# Patient Record
Sex: FEMALE | Race: WHITE | NOT HISPANIC OR LATINO | Employment: PART TIME | URBAN - METROPOLITAN AREA
[De-identification: names, ages, dates, MRNs, and addresses within clinical notes are randomized per-mention and may not be internally consistent; named-entity substitution may affect disease eponyms.]

---

## 2021-04-27 ENCOUNTER — INITIAL PRENATAL (OUTPATIENT)
Dept: OBGYN | Facility: CLINIC | Age: 36
End: 2021-04-27
Payer: COMMERCIAL

## 2021-04-27 VITALS — DIASTOLIC BLOOD PRESSURE: 72 MMHG | SYSTOLIC BLOOD PRESSURE: 108 MMHG | WEIGHT: 243 LBS

## 2021-04-27 DIAGNOSIS — O09.90 HIGH RISK PREGNANCY, ANTEPARTUM: ICD-10-CM

## 2021-04-27 DIAGNOSIS — O09.91 HIGH-RISK PREGNANCY IN FIRST TRIMESTER: ICD-10-CM

## 2021-04-27 PROCEDURE — 59401 PR NEW OB VISIT: CPT | Performed by: OBSTETRICS & GYNECOLOGY

## 2021-04-27 RX ORDER — PYRIDOXINE HCL (VITAMIN B6) 25 MG
25 TABLET ORAL
COMMUNITY
Start: 2021-04-15 | End: 2021-08-23

## 2021-04-27 RX ORDER — PANTOPRAZOLE SODIUM 40 MG/1
TABLET, DELAYED RELEASE ORAL
COMMUNITY
Start: 2020-05-07 | End: 2021-08-23

## 2021-04-27 RX ORDER — PROMETHAZINE HYDROCHLORIDE 25 MG/1
25 TABLET ORAL
COMMUNITY
Start: 2021-03-12 | End: 2021-08-23

## 2021-04-27 RX ORDER — ASPIRIN 81 MG/1
81 TABLET ORAL DAILY
COMMUNITY
Start: 2021-04-09 | End: 2021-08-23

## 2021-04-27 RX ORDER — PNV NO.95/FERROUS FUM/FOLIC AC 28MG-0.8MG
1 TABLET ORAL DAILY
COMMUNITY
Start: 2021-02-26 | End: 2021-08-23

## 2021-04-27 ASSESSMENT — EDINBURGH POSTNATAL DEPRESSION SCALE (EPDS)
THE THOUGHT OF HARMING MYSELF HAS OCCURRED TO ME: NEVER
I HAVE FELT SCARED OR PANICKY FOR NO GOOD REASON: NO, NOT MUCH
I HAVE BLAMED MYSELF UNNECESSARILY WHEN THINGS WENT WRONG: NOT VERY OFTEN
I HAVE BEEN ABLE TO LAUGH AND SEE THE FUNNY SIDE OF THINGS: AS MUCH AS I ALWAYS COULD
I HAVE BEEN SO UNHAPPY THAT I HAVE BEEN CRYING: NO, NEVER
I HAVE LOOKED FORWARD WITH ENJOYMENT TO THINGS: AS MUCH AS I EVER DID
I HAVE FELT SAD OR MISERABLE: NO, NOT AT ALL
I HAVE BEEN SO UNHAPPY THAT I HAVE HAD DIFFICULTY SLEEPING: NOT AT ALL
I HAVE BEEN ANXIOUS OR WORRIED FOR NO GOOD REASON: NO, NOT AT ALL
THINGS HAVE BEEN GETTING ON TOP OF ME: YES, SOMETIMES I HAVEN'T BEEN COPING AS WELL AS USUAL
TOTAL SCORE: 4

## 2021-04-27 NOTE — PROGRESS NOTES
Cc: New OB visit    HPI:  The patient is a 36 y.o.  13w6d by lmp c/w 7wk US (3/12 measuring 7w4d in [facility] notes)Patient's last menstrual period was 2021.      She presents for her new obstetric visit.  Recently moved from [place].  Currently feels well.  No major concerns today.  Patient does have a history of gastric bypass between her last 2 pregnancies, also has a history of repaired perforated ulcer,  x2, abdominoplasty, mammoplasty.  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  Denies complications with her other pregnancies including elevated blood pressures and diabetes.  Has had normal prenatal labs as below including NIPT genetic screening.    ROS:  12 system ROS performed, pertinent positives and negatives as above in HPI; see intake form for details.            OB History    Para Term  AB Living   4 2 2   1 2   SAB TAB Ectopic Molar Multiple Live Births   1   0     2      # Outcome Date GA Lbr Tylor/2nd Weight Sex Delivery Anes PTL Lv   4 Current            3 Term 19 38w4d  2.22 kg (4 lb 14.3 oz) F CS-LTranv Spinal  MEAGHAN      Birth Comments: SGA   2 SAB            1 Term 07/04/10 40w0d  3.118 kg (6 lb 14 oz) M CS-LTranv   MEAGHAN      Complications: Cephalopelvic Disproportion   no diabetes or elevated Bps.      GYNHx:  13/28/3  Denies hx of STIs  Denies hx of abnormal paps, last  NILM    PMHx: denies    Past Surgical History:   Procedure Laterality Date   • ABDOMINOPLASTY     • GASTRIC BYPASS LAPAROSCOPIC     • MAMMOPLASTY AUGMENTATION     • PRIMARY C SECTION      c/s x2   • REPAIR PERFORATED ULCER       Current Outpatient Medications on File Prior to Visit   Medication Sig Dispense Refill   • doxylamine (UNISOM) 25 MG Tab tablet Take 0.5 tablet by mouth 1 to 2 times a day     • pantoprazole (PROTONIX) 40 MG Tablet Delayed Response Take  by mouth.     • Prenatal Multivit-Min-Fe-FA (PRENATAL/IRON) Tab Take 1 tablet by mouth every day.     • promethazine  (PHENERGAN) 25 MG Tab Take 25 mg by mouth.     • pyridoxine (VITAMIN B-6) 25 MG Tab Take 25 mg by mouth.     • Multiple Vitamins-Minerals (MULTIVITAMIN ADULT EXTRA C PO) Take  by mouth.       No current facility-administered medications on file prior to visit.       Allergies:   Allergies as of 2021 - Reviewed 2021   Allergen Reaction Noted   • Sulfa drugs  2014       Family History   Problem Relation Age of Onset   • No Known Problems Mother    • No Known Problems Father    • No Known Problems Sister    • No Known Problems Brother        Social History     Tobacco Use   • Smoking status: Never Smoker   • Smokeless tobacco: Never Used   Substance Use Topics   • Alcohol use: Not Currently   • Drug use: Never       PE:    /72   Wt 110 kg (243 lb)   Gen: AAO, NAD  Abd: soft, FHTs 150 by doppler      CF/SMA neg  A1c 5.0  Rh+/-  RI  HIV/HBsAg neg/T pall neg  NIPT neg  B12 wnl, TSH wnl, B1 wnl    A/P:  36 y.o.  13w6d based upon by LMP consistent with 7-week ultrasound w/ hx of c/s x2, gastric bypass, AMA  1. High-risk pregnancy in first trimester     2. High risk pregnancy, antepartum  REFERRAL TO PERINATOLOGY       - discussed hospitalist coverage of L&D coverage, docs and CNM involvement in care.  - hx c/s x2: Patient desires repeat  with permanent sterilization    -History of gastric bypass: Vitamin levels checked as above and normal, will need to repeat going into the third trimester.  Will need serial growth ultrasounds, will get perinatology consult for this with anatomy ultrasound  -AMA: Status post normal NIPT    After visit noted patient has not yet had gonorrhea, chlamydia screening in this pregnancy.  Will recommend at follow-up visit.    F/U 4wks    Allie Schmid MD  [facility], Women's [facility]       1) You have an appointment with interventional radiology here at [facility] on Monday, 8/21/17 at 11:00am. Please arrive no later than 10:30am. Upon arrival, please go to outpatient registration.     2) Please make an appointment to follow-up with Dr. Silvestre within 7-10 days after your discharge. You may call her office to make an appointment at: [phone].

## 2021-04-27 NOTE — PROGRESS NOTES
Pt. Here for NOB visit today.  #[phone]  First prenatal care  Pt. States that she is not able to eat and really bad stomach cramps. Pt states that she has headaches constantly   Pharmacy verified  Chaperone offered and provided

## 2021-04-28 RX ORDER — PROMETHAZINE HYDROCHLORIDE 25 MG/1
25 TABLET ORAL
COMMUNITY
Start: 2021-03-12 | End: 2021-08-23

## 2021-05-06 ENCOUNTER — TELEPHONE (OUTPATIENT)
Dept: OBGYN | Facility: CLINIC | Age: 36
End: 2021-05-06

## 2021-05-06 ENCOUNTER — NURSE TRIAGE (OUTPATIENT)
Dept: HEALTH INFORMATION MANAGEMENT | Facility: OTHER | Age: 36
End: 2021-05-06

## 2021-05-06 NOTE — TELEPHONE ENCOUNTER
Patient called because she is having abdominal cramping and clear discharge that has been going on every 4-5 days. Advised pt to keep up with water intake, relax and to take tylenol no more that 3000 mg in a 24 hr period. Advised pt that if the pain gets to were its not bearable she would need to be evaluated at the hospital. No further questions.

## 2021-05-06 NOTE — TELEPHONE ENCOUNTER
05/06/21 3:27 PM    Pt called nurse triage stating she thinks she has an UTI, having cramping and abdominal pain with discharge.  Nurse triage states the pt hung up before she could transfer. Called pt and went straight to . LM for pt to call back.

## 2021-05-06 NOTE — TELEPHONE ENCOUNTER
"Pt 15 weeks pregnant and stating  She is feeling lower abd pain that radiates to back. In the last 15 mins started feeling cramping. Clear vaginal discharge noted. Denies all other s/sx. Pt of [facility]. MA called and care assumed. Will call pt and triage.     Reason for Disposition  • [1] Intermittent lower abdominal pain (e.g., cramping) AND [2] present > 24 hours    Additional Information  • Negative: Passed out (i.e., lost consciousness, collapsed and was not responding)  • Negative: Shock suspected (e.g., cold/pale/clammy skin, too weak to stand, low BP, rapid pulse)  • Negative: Difficult to awaken or acting confused (e.g., disoriented, slurred speech)  • Negative: Sounds like a life-threatening emergency to the triager  • Negative: Followed an abdomen (stomach) injury  • Negative: [1] Abdominal pain AND [2] pregnant > 20 weeks  • Negative: MODERATE-SEVERE abdominal pain (e.g., interferes with normal activities, awakens from sleep)  • Negative: [1] SEVERE vaginal bleeding (i.e., soaking 2 pads / hour, large blood clots) AND [2] present 2 or more hours  • Negative: [1] MODERATE vaginal bleeding (i.e., soaking 1 pad / hour; clots) AND [2] present > 6 hours  • Negative: [1] MODERATE vaginal bleeding (i.e., soaking 1 pad / hour; clots) AND [2] pregnant > 12 weeks  • Negative: Passed tissue (e.g., gray-white)  • Negative: [1] Vomiting AND [2] contains red blood or black (\"coffee ground\") material  (Exception: few red streaks in vomit that only happened once)  • Negative: Shoulder pain  • Negative: Lightheadedness or dizziness (e.g., feels like passing out)  • Negative: Patient sounds very sick or weak to the triager  • Negative: [1] Constant abdominal pain AND [2] present > 2 hours  • Negative: Blood in urine (red, pink, or tea-colored)  • Negative: Fever > 100.4 F (38.0 C)  • Negative: White of the eyes have turned yellow (i.e., jaundice)    Answer Assessment - Initial Assessment Questions  1. LOCATION: \"Where does it " "hurt?\"       stomach  2. RADIATION: \"Does the pain shoot anywhere else?\" (e.g., chest, back, shoulder)      Radiates to back and comes and goes  3. ONSET: \"When did the pain begin?\" (e.g., minutes, hours or days ago)       10 mins and all day  4. ONSET: \"Gradual or sudden onset?\"      sudden  5. PATTERN \"Does the pain come and go, or has it been constant since it started?\"       Come and go  6. SEVERITY: \"How bad is the pain?\" \"What does it keep you from doing?\"  (e.g., Scale 1-10; mild, moderate, or severe)    - MILD (1-3): doesn't interfere with normal activities, abdomen soft and not tender to touch     - MODERATE (4-7): interferes with normal activities or awakens from sleep, tender to touch     - SEVERE (8-10): excruciating pain, doubled over, unable to do any normal activities      8  7. RECURRENT SYMPTOM: \"Have you ever had this type of abdominal pain before?\" If so, ask: \"When was the last time?\" and \"What happened that time?\"       no  8. CAUSE: \"What do you think is causing the abdominal pain?\"      unsure  9. RELIEVING/AGGRAVATING FACTORS: \"What makes it better or worse?\" (e.g., antacids, bowel movement, movement)      Go bathroom makes it worse  10. OTHER SYMPTOMS: \"Has there been any vaginal bleeding, fever, vomiting, diarrhea, or urine problems?\"        Vomiting, vaginal discharge,   11. PRASANTH: \"What date are you expecting to deliver?\"        10/22    Protocols used: PREGNANCY - ABDOMINAL PAIN LESS THAN 20 WEEKS EGA-A-AH      "

## 2021-05-26 ENCOUNTER — ROUTINE PRENATAL (OUTPATIENT)
Dept: OBGYN | Facility: CLINIC | Age: 36
End: 2021-05-26
Payer: COMMERCIAL

## 2021-05-26 VITALS — WEIGHT: 241.1 LBS | DIASTOLIC BLOOD PRESSURE: 90 MMHG | SYSTOLIC BLOOD PRESSURE: 119 MMHG

## 2021-05-26 DIAGNOSIS — Z34.92 SECOND TRIMESTER PREGNANCY: ICD-10-CM

## 2021-05-26 DIAGNOSIS — O16.2 ELEVATED BLOOD PRESSURE AFFECTING PREGNANCY IN SECOND TRIMESTER, ANTEPARTUM: ICD-10-CM

## 2021-05-26 DIAGNOSIS — O21.9 NAUSEA AND VOMITING OF PREGNANCY, ANTEPARTUM: ICD-10-CM

## 2021-05-26 PROCEDURE — 90040 PR PRENATAL FOLLOW UP: CPT | Performed by: OBSTETRICS & GYNECOLOGY

## 2021-05-26 RX ORDER — ONDANSETRON 4 MG/1
4 TABLET, ORALLY DISINTEGRATING ORAL EVERY 6 HOURS PRN
Qty: 20 TABLET | Refills: 1 | Status: SHIPPED | OUTPATIENT
Start: 2021-05-26 | End: 2021-08-23

## 2021-06-23 ENCOUNTER — HOSPITAL ENCOUNTER (OUTPATIENT)
Dept: LAB | Facility: MEDICAL CENTER | Age: 36
End: 2021-06-23
Attending: OBSTETRICS & GYNECOLOGY
Payer: COMMERCIAL

## 2021-06-23 ENCOUNTER — HOSPITAL ENCOUNTER (OUTPATIENT)
Dept: RADIOLOGY | Facility: MEDICAL CENTER | Age: 36
End: 2021-06-23
Attending: OBSTETRICS & GYNECOLOGY
Payer: COMMERCIAL

## 2021-06-23 DIAGNOSIS — O16.2 ELEVATED BLOOD PRESSURE AFFECTING PREGNANCY IN SECOND TRIMESTER, ANTEPARTUM: ICD-10-CM

## 2021-06-23 DIAGNOSIS — Z34.92 SECOND TRIMESTER PREGNANCY: ICD-10-CM

## 2021-06-23 LAB
ALBUMIN SERPL BCP-MCNC: 3.6 G/DL (ref 3.2–4.9)
ALBUMIN/GLOB SERPL: 1.1 G/DL
ALP SERPL-CCNC: 75 U/L (ref 30–99)
ALT SERPL-CCNC: 10 U/L (ref 2–50)
ANION GAP SERPL CALC-SCNC: 9 MMOL/L (ref 7–16)
AST SERPL-CCNC: 15 U/L (ref 12–45)
BASOPHILS # BLD AUTO: 0.1 % (ref 0–1.8)
BASOPHILS # BLD: 0.01 K/UL (ref 0–0.12)
BILIRUB SERPL-MCNC: 0.3 MG/DL (ref 0.1–1.5)
BUN SERPL-MCNC: 8 MG/DL (ref 8–22)
CALCIUM SERPL-MCNC: 9.2 MG/DL (ref 8.5–10.5)
CHLORIDE SERPL-SCNC: 106 MMOL/L (ref 96–112)
CO2 SERPL-SCNC: 21 MMOL/L (ref 20–33)
CREAT SERPL-MCNC: 0.56 MG/DL (ref 0.5–1.4)
CREAT UR-MCNC: 116.49 MG/DL
EOSINOPHIL # BLD AUTO: 0.04 K/UL (ref 0–0.51)
EOSINOPHIL NFR BLD: 0.4 % (ref 0–6.9)
ERYTHROCYTE [DISTWIDTH] IN BLOOD BY AUTOMATED COUNT: 47.5 FL (ref 35.9–50)
GLOBULIN SER CALC-MCNC: 3.2 G/DL (ref 1.9–3.5)
GLUCOSE SERPL-MCNC: 82 MG/DL (ref 65–99)
HCT VFR BLD AUTO: 36.6 % (ref 37–47)
HGB BLD-MCNC: 11.8 G/DL (ref 12–16)
IMM GRANULOCYTES # BLD AUTO: 0.04 K/UL (ref 0–0.11)
IMM GRANULOCYTES NFR BLD AUTO: 0.4 % (ref 0–0.9)
LYMPHOCYTES # BLD AUTO: 1.38 K/UL (ref 1–4.8)
LYMPHOCYTES NFR BLD: 14.4 % (ref 22–41)
MCH RBC QN AUTO: 30 PG (ref 27–33)
MCHC RBC AUTO-ENTMCNC: 32.2 G/DL (ref 33.6–35)
MCV RBC AUTO: 93.1 FL (ref 81.4–97.8)
MONOCYTES # BLD AUTO: 0.36 K/UL (ref 0–0.85)
MONOCYTES NFR BLD AUTO: 3.8 % (ref 0–13.4)
NEUTROPHILS # BLD AUTO: 7.76 K/UL (ref 2–7.15)
NEUTROPHILS NFR BLD: 80.9 % (ref 44–72)
NRBC # BLD AUTO: 0 K/UL
NRBC BLD-RTO: 0 /100 WBC
PLATELET # BLD AUTO: 289 K/UL (ref 164–446)
PMV BLD AUTO: 11 FL (ref 9–12.9)
POTASSIUM SERPL-SCNC: 4.4 MMOL/L (ref 3.6–5.5)
PROT SERPL-MCNC: 6.8 G/DL (ref 6–8.2)
PROT UR-MCNC: 8 MG/DL (ref 0–15)
PROT/CREAT UR: 69 MG/G (ref 10–107)
RBC # BLD AUTO: 3.93 M/UL (ref 4.2–5.4)
SODIUM SERPL-SCNC: 136 MMOL/L (ref 135–145)
WBC # BLD AUTO: 9.6 K/UL (ref 4.8–10.8)

## 2021-06-23 PROCEDURE — 85025 COMPLETE CBC W/AUTO DIFF WBC: CPT

## 2021-06-23 PROCEDURE — 76805 OB US >/= 14 WKS SNGL FETUS: CPT

## 2021-06-23 PROCEDURE — 80053 COMPREHEN METABOLIC PANEL: CPT

## 2021-06-23 PROCEDURE — 36415 COLL VENOUS BLD VENIPUNCTURE: CPT

## 2021-06-23 PROCEDURE — 81511 FTL CGEN ABNOR FOUR ANAL: CPT

## 2021-06-23 PROCEDURE — 84156 ASSAY OF PROTEIN URINE: CPT

## 2021-06-23 PROCEDURE — 82570 ASSAY OF URINE CREATININE: CPT

## 2021-06-25 ENCOUNTER — ROUTINE PRENATAL (OUTPATIENT)
Dept: OBGYN | Facility: CLINIC | Age: 36
End: 2021-06-25
Payer: COMMERCIAL

## 2021-06-25 VITALS — DIASTOLIC BLOOD PRESSURE: 63 MMHG | WEIGHT: 245 LBS | SYSTOLIC BLOOD PRESSURE: 96 MMHG

## 2021-06-25 DIAGNOSIS — O09.892 SUPERVISION OF OTHER HIGH RISK PREGNANCIES, SECOND TRIMESTER: Primary | ICD-10-CM

## 2021-06-25 PROCEDURE — 90040 PR PRENATAL FOLLOW UP: CPT | Performed by: NURSE PRACTITIONER

## 2021-06-25 ASSESSMENT — FIBROSIS 4 INDEX: FIB4 SCORE: 0.57

## 2021-06-25 NOTE — PROGRESS NOTES
No concerns today.  Feeling good FM.  Reviewed her labs and US with her.  Will order repeat as not able to visualize spine.

## 2021-06-26 LAB
# FETUSES US: NORMAL
AFP MOM SERPL: 0.76
AFP SERPL-MCNC: 45 NG/ML
AGE - REPORTED: 35.8 YR
CURRENT SMOKER: NO
FAMILY MEMBER DISEASES HX: NO
GA METHOD: NORMAL
GA: NORMAL WK
HCG MOM SERPL: 0.44
HCG SERPL-ACNC: 6283 IU/L
HX OF HEREDITARY DISORDERS: NO
IDDM PATIENT QL: NO
INHIBIN A MOM SERPL: 0.72
INHIBIN A SERPL-MCNC: 113 PG/ML
INTEGRATED SCN PATIENT-IMP: NORMAL
PATHOLOGY STUDY: NORMAL
SPECIMEN DRAWN SERPL: NORMAL
U ESTRIOL MOM SERPL: 1.42
U ESTRIOL SERPL-MCNC: 3.72 NG/ML

## 2021-06-29 DIAGNOSIS — Z34.92 SECOND TRIMESTER PREGNANCY: ICD-10-CM

## 2021-07-08 ENCOUNTER — HOSPITAL ENCOUNTER (EMERGENCY)
Facility: MEDICAL CENTER | Age: 36
End: 2021-07-08
Attending: OBSTETRICS & GYNECOLOGY | Admitting: OBSTETRICS & GYNECOLOGY
Payer: COMMERCIAL

## 2021-07-08 VITALS
SYSTOLIC BLOOD PRESSURE: 128 MMHG | HEIGHT: 68 IN | BODY MASS INDEX: 36.68 KG/M2 | RESPIRATION RATE: 14 BRPM | TEMPERATURE: 97.8 F | WEIGHT: 242 LBS | DIASTOLIC BLOOD PRESSURE: 71 MMHG | HEART RATE: 80 BPM

## 2021-07-08 LAB
APPEARANCE UR: CLEAR
COLOR UR AUTO: YELLOW
GLUCOSE UR QL STRIP.AUTO: NEGATIVE MG/DL
KETONES UR QL STRIP.AUTO: NEGATIVE MG/DL
LEUKOCYTE ESTERASE UR QL STRIP.AUTO: NEGATIVE
NITRITE UR QL STRIP.AUTO: NEGATIVE
PH UR STRIP.AUTO: 6.5 [PH] (ref 5–8)
PROT UR QL STRIP: NEGATIVE MG/DL
RBC UR QL AUTO: NEGATIVE
SP GR UR STRIP.AUTO: 1.02 (ref 1–1.03)

## 2021-07-08 PROCEDURE — 700102 HCHG RX REV CODE 250 W/ 637 OVERRIDE(OP): Performed by: OBSTETRICS & GYNECOLOGY

## 2021-07-08 PROCEDURE — A9270 NON-COVERED ITEM OR SERVICE: HCPCS | Performed by: OBSTETRICS & GYNECOLOGY

## 2021-07-08 PROCEDURE — 99284 EMERGENCY DEPT VISIT MOD MDM: CPT

## 2021-07-08 PROCEDURE — 81002 URINALYSIS NONAUTO W/O SCOPE: CPT

## 2021-07-08 RX ORDER — ACETAMINOPHEN 325 MG/1
650 TABLET ORAL ONCE
Status: COMPLETED | OUTPATIENT
Start: 2021-07-08 | End: 2021-07-08

## 2021-07-08 RX ADMIN — ACETAMINOPHEN 650 MG: 325 TABLET, FILM COATED ORAL at 18:05

## 2021-07-08 ASSESSMENT — FIBROSIS 4 INDEX
FIB4 SCORE: 0.57
FIB4 SCORE: 0.57

## 2021-07-08 ASSESSMENT — PAIN SCALES - GENERAL: PAINLEVEL: 8

## 2021-07-08 NOTE — PROGRESS NOTES
36 y.o.    Edc=10/27  24.1 weeks    TOCO and US on.  VSS.  Pt presents to triage with c/o HA since this am accompanied by spots in her vision and nausea.  She has no other obstetrical complaints.  -Report given to Jose RN-pt care assumed

## 2021-07-09 NOTE — DISCHARGE INSTRUCTIONS
General Instructions:  · If you think you are in labor, time contractions (lying on your left side) from the beginning of one contraction to the beginning of the next contraction for at least one hour.  · Increase fluid intake: you should consume 10-12 8 oz glasses of non-caffeinated fluid per day.  · Report any pressure or burning on urination to your physician.  · Monitor fetal movement: If you notice an absence or decrease in fetal movement, drink a large glass of water and rest on your side.  If there is no increase in movement, call your physician or go to the hospital for further evaluation.  · Report any sudden, sharp abdominal pain.  · Report any bleeding.  Spotting or pinkish discharge is normal after vaginal exam.  You may also spot after sexual intercourse.    Pre-term Labor (<37 weeks):  Call your physician or return to the hospital if:  · You have painless regular contractions more than 4 in one hour.  · Your water breaks (remember time and color).  · You have menstrual-like cramps, a low dull backache or pressure in your pelvis or back.  · Your baby does not move enough to complete the daily kick count (10 movements in 2 hours).  · Your baby moves much less often than on the days before or you have not felt your baby move all day.  · Please review the MEDICATION LIST section of your AFTER VISIT SUMMARY document.  · Take your medication as prescribed    Urinary Tract Infection:  · Increase your fluid intake.  Avoid coffee, tea, soledad, and other carbonated drinks.  · Please review the MEDICATION LIST section of your AFTER VISIT SUMMARY document.  · Take medications as prescribed.  · Take Medication until it is gone even if you feel better.  · Notify your physician if there is no improvement in your condition.    High Blood Pressure:  · Rest on your right or left side.  · Report any severe headaches, dizziness, blurred vision or spots before your eyes.  · Report excessive swelling of your hands, face or  feet.  · Report epigastric pain (upper abdominal pain)      Other Instructions:  Please carefully review your entire AFTER VISIT SUMMARY document for all discharge instructions.
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
## 2021-07-09 NOTE — ED PROVIDER NOTES
OB H&P:    CC: Band like HA with vision changes    HPI:  Ms. Aleyda Verma is a 36 y.o.  @ 24w1d by LMP who presents to the OB ED reporting a band like HA that extends down into her upper neck with spots in her vision since the AM. She denies feeling more nauseous than baseline and has no vomiting. She has no SOB, palpitations, pain or unusual swelling in her calves.    Contractions: No   Loss of fluid: No   Vaginal bleeding: No   Fetal movement: present      PNC with [facility]    PNL: WNL for 24 week labs  RhNA, R NA, HIV neg NA, TrepAb neg NA, HBsAg NR NA, GC/CT neg/neg NA  Glucola: NA  GBS NA      ROS:  Const: denies fevers, general concerns  CV/resp: reports no concerns  GI: denies abd pain, GI concerns  : see HPI  Neuro: denies HA/vision changes    OB History    Para Term  AB Living   4 2 2   1 2   SAB TAB Ectopic Molar Multiple Live Births   1   0     2      # Outcome Date GA Lbr Tylor/2nd Weight Sex Delivery Anes PTL Lv   4 Current            3 Term 19 38w4d  2.22 kg (4 lb 14.3 oz) F CS-LTranv Spinal  MEAGHAN      Birth Comments: SGA   2 SAB            1 Term 07/04/10 40w0d  3.118 kg (6 lb 14 oz) M CS-LTranv   MEAGHAN      Complications: Cephalopelvic Disproportion         PMHx: Hx of migraines as a teenager. Acid reflux. Denies other medical hx.    Past Surgical History:   Procedure Laterality Date   • ABDOMINOPLASTY     • GASTRIC BYPASS LAPAROSCOPIC     • MAMMOPLASTY AUGMENTATION     • PRIMARY C SECTION      c/s x2   • REPAIR PERFORATED ULCER         No current facility-administered medications on file prior to encounter.     Current Outpatient Medications on File Prior to Encounter   Medication Sig Dispense Refill   • ondansetron (ZOFRAN ODT) 4 MG TABLET DISPERSIBLE Take 1 tablet by mouth every 6 hours as needed for Nausea. 20 tablet 1   • promethazine (PHENERGAN) 25 MG Tab Take 25 mg by mouth. (Patient not taking: Reported on 2021)     • aspirin 81 MG EC tablet Take 81 mg by mouth  every day. (Patient not taking: Reported on 2021)     • doxylamine (UNISOM) 25 MG Tab tablet Take 0.5 tablet by mouth 1 to 2 times a day     • pantoprazole (PROTONIX) 40 MG Tablet Delayed Response Take  by mouth. (Patient not taking: Reported on 2021)     • Prenatal Multivit-Min-Fe-FA (PRENATAL/IRON) Tab Take 1 tablet by mouth every day.     • promethazine (PHENERGAN) 25 MG Tab Take 25 mg by mouth. (Patient not taking: Reported on 2021)     • pyridoxine (VITAMIN B-6) 25 MG Tab Take 25 mg by mouth. (Patient not taking: Reported on 2021)     • Multiple Vitamins-Minerals (MULTIVITAMIN ADULT EXTRA C PO) Take  by mouth.         Family History   Problem Relation Age of Onset   • No Known Problems Mother    • No Known Problems Father    • No Known Problems Sister    • No Known Problems Brother        Social History     Tobacco Use   • Smoking status: Never Smoker   • Smokeless tobacco: Never Used   Vaping Use   • Vaping Use: Never used   Substance Use Topics   • Alcohol use: Not Currently   • Drug use: Never         PE:  Vitals:    21 1600 21 1644   BP:  128/71   Pulse:  80   Resp:  14   Temp:  36.6 °C (97.8 °F)   TempSrc:  Temporal   Weight: 111 kg (245 lb)      gen: AAO, NAD  Resp: Unlabored breathing on RA  abd: soft, gravid, NT,  Ext: NT, No edema    SVE: Not indicated at this time  FHT: 150 moderate variability, no decels, appropriate for gestation      A/P: 36 y.o.  @ 24w1d by LMP with HA and visual changes    Plan:  R/o Pre-eclampsia. /71. POC urine w/o protein  Likely Migraine vs tension HA.  OTC tylenol, 650 mg q6 PRN and 25 mg benadryl at night for sleep PRN  Discharge to home with strict return precautions          Pedro Pablo Rowley M.D.[email or web address]

## 2021-07-22 ENCOUNTER — HOSPITAL ENCOUNTER (OUTPATIENT)
Dept: RADIOLOGY | Facility: MEDICAL CENTER | Age: 36
End: 2021-07-22
Attending: OBSTETRICS & GYNECOLOGY
Payer: COMMERCIAL

## 2021-07-22 DIAGNOSIS — Z34.92 SECOND TRIMESTER PREGNANCY: ICD-10-CM

## 2021-07-22 PROCEDURE — 76815 OB US LIMITED FETUS(S): CPT

## 2021-07-27 ENCOUNTER — ROUTINE PRENATAL (OUTPATIENT)
Dept: OBGYN | Facility: CLINIC | Age: 36
End: 2021-07-27
Payer: COMMERCIAL

## 2021-07-27 VITALS — SYSTOLIC BLOOD PRESSURE: 124 MMHG | BODY MASS INDEX: 36.64 KG/M2 | WEIGHT: 241 LBS | DIASTOLIC BLOOD PRESSURE: 64 MMHG

## 2021-07-27 DIAGNOSIS — Z34.92 SECOND TRIMESTER PREGNANCY: ICD-10-CM

## 2021-07-27 PROCEDURE — 90040 PR PRENATAL FOLLOW UP: CPT | Performed by: OBSTETRICS & GYNECOLOGY

## 2021-07-27 RX ORDER — GLUCOSAMINE HCL/CHONDROITIN SU 500-400 MG
CAPSULE ORAL
Qty: 100 EACH | Refills: 6 | Status: SHIPPED | OUTPATIENT
Start: 2021-07-27 | End: 2021-08-23

## 2021-07-27 RX ORDER — LANCETS 30 GAUGE
EACH MISCELLANEOUS
Qty: 100 EACH | Refills: 6 | Status: SHIPPED | OUTPATIENT
Start: 2021-07-27 | End: 2021-08-23

## 2021-07-27 ASSESSMENT — FIBROSIS 4 INDEX: FIB4 SCORE: 0.57

## 2021-08-23 ENCOUNTER — ROUTINE PRENATAL (OUTPATIENT)
Dept: OBGYN | Facility: CLINIC | Age: 36
End: 2021-08-23
Payer: COMMERCIAL

## 2021-08-23 ENCOUNTER — HOSPITAL ENCOUNTER (OUTPATIENT)
Dept: LAB | Facility: MEDICAL CENTER | Age: 36
End: 2021-08-23
Attending: ADVANCED PRACTICE MIDWIFE
Payer: COMMERCIAL

## 2021-08-23 VITALS — WEIGHT: 245 LBS | SYSTOLIC BLOOD PRESSURE: 112 MMHG | DIASTOLIC BLOOD PRESSURE: 68 MMHG | BODY MASS INDEX: 37.25 KG/M2

## 2021-08-23 DIAGNOSIS — Z34.83 ENCOUNTER FOR SUPERVISION OF OTHER NORMAL PREGNANCY, THIRD TRIMESTER: ICD-10-CM

## 2021-08-23 LAB
EST. AVERAGE GLUCOSE BLD GHB EST-MCNC: 97 MG/DL
HBA1C MFR BLD: 5 % (ref 4–5.6)

## 2021-08-23 PROCEDURE — 36415 COLL VENOUS BLD VENIPUNCTURE: CPT

## 2021-08-23 PROCEDURE — 83036 HEMOGLOBIN GLYCOSYLATED A1C: CPT

## 2021-08-23 PROCEDURE — 90040 PR PRENATAL FOLLOW UP: CPT | Performed by: ADVANCED PRACTICE MIDWIFE

## 2021-08-23 ASSESSMENT — FIBROSIS 4 INDEX: FIB4 SCORE: 0.57

## 2021-08-23 NOTE — PROGRESS NOTES
Patient reports lancets and meter are too expensive. Estimated cost at the pharmacy was $500. No history of gestational diabetes. Patient does have history of gastric sleeve and can not complete 1hr GTT. I have provided hemoglobin A1C order for patient. We did discuss option of Reli On Meter at [facility] that has a significantly lower cost ~$50.

## 2021-09-03 ENCOUNTER — HOSPITAL ENCOUNTER (INPATIENT)
Facility: MEDICAL CENTER | Age: 36
LOS: 1 days | DRG: 833 | End: 2021-09-05
Attending: OBSTETRICS & GYNECOLOGY | Admitting: OBSTETRICS & GYNECOLOGY
Payer: COMMERCIAL

## 2021-09-03 LAB
A1 MICROGLOB PLACENTAL VAG QL: NEGATIVE
ALBUMIN SERPL BCP-MCNC: 3.4 G/DL (ref 3.2–4.9)
ALBUMIN/GLOB SERPL: 0.9 G/DL
ALP SERPL-CCNC: 105 U/L (ref 30–99)
ALT SERPL-CCNC: 10 U/L (ref 2–50)
ANION GAP SERPL CALC-SCNC: 15 MMOL/L (ref 7–16)
APPEARANCE UR: CLEAR
AST SERPL-CCNC: 14 U/L (ref 12–45)
BILIRUB SERPL-MCNC: 0.4 MG/DL (ref 0.1–1.5)
BUN SERPL-MCNC: 8 MG/DL (ref 8–22)
CALCIUM SERPL-MCNC: 9.7 MG/DL (ref 8.5–10.5)
CHLORIDE SERPL-SCNC: 101 MMOL/L (ref 96–112)
CO2 SERPL-SCNC: 20 MMOL/L (ref 20–33)
COLOR UR AUTO: YELLOW
CREAT SERPL-MCNC: 0.61 MG/DL (ref 0.5–1.4)
CREAT UR-MCNC: 48.26 MG/DL
ERYTHROCYTE [DISTWIDTH] IN BLOOD BY AUTOMATED COUNT: 42.5 FL (ref 35.9–50)
GLOBULIN SER CALC-MCNC: 3.6 G/DL (ref 1.9–3.5)
GLUCOSE BLD-MCNC: 111 MG/DL (ref 65–99)
GLUCOSE BLD-MCNC: 75 MG/DL (ref 65–99)
GLUCOSE SERPL-MCNC: 82 MG/DL (ref 65–99)
GLUCOSE UR QL STRIP.AUTO: NEGATIVE MG/DL
HCT VFR BLD AUTO: 36.2 % (ref 37–47)
HGB BLD-MCNC: 11.8 G/DL (ref 12–16)
KETONES UR QL STRIP.AUTO: 15 MG/DL
LDH SERPL L TO P-CCNC: 134 U/L (ref 107–266)
LEUKOCYTE ESTERASE UR QL STRIP.AUTO: NEGATIVE
MCH RBC QN AUTO: 28.6 PG (ref 27–33)
MCHC RBC AUTO-ENTMCNC: 32.6 G/DL (ref 33.6–35)
MCV RBC AUTO: 87.9 FL (ref 81.4–97.8)
NITRITE UR QL STRIP.AUTO: NEGATIVE
PH UR STRIP.AUTO: 5 [PH] (ref 5–8)
PLATELET # BLD AUTO: 321 K/UL (ref 164–446)
PMV BLD AUTO: 10.3 FL (ref 9–12.9)
POTASSIUM SERPL-SCNC: 4.3 MMOL/L (ref 3.6–5.5)
PROT SERPL-MCNC: 7 G/DL (ref 6–8.2)
PROT UR QL STRIP: 100 MG/DL
PROT UR-MCNC: 4 MG/DL (ref 0–15)
PROT/CREAT UR: 83 MG/G (ref 10–107)
RBC # BLD AUTO: 4.12 M/UL (ref 4.2–5.4)
RBC UR QL AUTO: NEGATIVE
SODIUM SERPL-SCNC: 136 MMOL/L (ref 135–145)
SP GR UR STRIP.AUTO: >=1.03 (ref 1–1.03)
URATE SERPL-MCNC: 4.8 MG/DL (ref 1.9–8.2)
WBC # BLD AUTO: 10 K/UL (ref 4.8–10.8)

## 2021-09-03 PROCEDURE — 96375 TX/PRO/DX INJ NEW DRUG ADDON: CPT

## 2021-09-03 PROCEDURE — 700105 HCHG RX REV CODE 258: Performed by: OBSTETRICS & GYNECOLOGY

## 2021-09-03 PROCEDURE — 99284 EMERGENCY DEPT VISIT MOD MDM: CPT

## 2021-09-03 PROCEDURE — 84550 ASSAY OF BLOOD/URIC ACID: CPT

## 2021-09-03 PROCEDURE — 87150 DNA/RNA AMPLIFIED PROBE: CPT

## 2021-09-03 PROCEDURE — 302449 STATCHG TRIAGE ONLY (STATISTIC)

## 2021-09-03 PROCEDURE — 82570 ASSAY OF URINE CREATININE: CPT

## 2021-09-03 PROCEDURE — 700102 HCHG RX REV CODE 250 W/ 637 OVERRIDE(OP): Performed by: OBSTETRICS & GYNECOLOGY

## 2021-09-03 PROCEDURE — A9270 NON-COVERED ITEM OR SERVICE: HCPCS | Performed by: OBSTETRICS & GYNECOLOGY

## 2021-09-03 PROCEDURE — 81002 URINALYSIS NONAUTO W/O SCOPE: CPT

## 2021-09-03 PROCEDURE — 96376 TX/PRO/DX INJ SAME DRUG ADON: CPT

## 2021-09-03 PROCEDURE — 85027 COMPLETE CBC AUTOMATED: CPT

## 2021-09-03 PROCEDURE — 96372 THER/PROPH/DIAG INJ SC/IM: CPT

## 2021-09-03 PROCEDURE — G0378 HOSPITAL OBSERVATION PER HR: HCPCS

## 2021-09-03 PROCEDURE — 36415 COLL VENOUS BLD VENIPUNCTURE: CPT

## 2021-09-03 PROCEDURE — 87081 CULTURE SCREEN ONLY: CPT

## 2021-09-03 PROCEDURE — 84112 EVAL AMNIOTIC FLUID PROTEIN: CPT

## 2021-09-03 PROCEDURE — 83615 LACTATE (LD) (LDH) ENZYME: CPT

## 2021-09-03 PROCEDURE — 96374 THER/PROPH/DIAG INJ IV PUSH: CPT

## 2021-09-03 PROCEDURE — 84156 ASSAY OF PROTEIN URINE: CPT

## 2021-09-03 PROCEDURE — 80053 COMPREHEN METABOLIC PANEL: CPT

## 2021-09-03 PROCEDURE — 700111 HCHG RX REV CODE 636 W/ 250 OVERRIDE (IP): Performed by: OBSTETRICS & GYNECOLOGY

## 2021-09-03 PROCEDURE — 82962 GLUCOSE BLOOD TEST: CPT

## 2021-09-03 RX ORDER — NIFEDIPINE 10 MG/1
10 CAPSULE ORAL ONCE
Status: COMPLETED | OUTPATIENT
Start: 2021-09-03 | End: 2021-09-03

## 2021-09-03 RX ORDER — ONDANSETRON 2 MG/ML
4 INJECTION INTRAMUSCULAR; INTRAVENOUS EVERY 6 HOURS PRN
Status: DISCONTINUED | OUTPATIENT
Start: 2021-09-03 | End: 2021-09-05 | Stop reason: HOSPADM

## 2021-09-03 RX ORDER — SODIUM CHLORIDE, SODIUM LACTATE, POTASSIUM CHLORIDE, AND CALCIUM CHLORIDE .6; .31; .03; .02 G/100ML; G/100ML; G/100ML; G/100ML
500 INJECTION, SOLUTION INTRAVENOUS ONCE
Status: COMPLETED | OUTPATIENT
Start: 2021-09-03 | End: 2021-09-03

## 2021-09-03 RX ORDER — TERBUTALINE SULFATE 1 MG/ML
0.25 INJECTION, SOLUTION SUBCUTANEOUS ONCE
Status: COMPLETED | OUTPATIENT
Start: 2021-09-03 | End: 2021-09-03

## 2021-09-03 RX ORDER — BETAMETHASONE SODIUM PHOSPHATE AND BETAMETHASONE ACETATE 3; 3 MG/ML; MG/ML
12 INJECTION, SUSPENSION INTRA-ARTICULAR; INTRALESIONAL; INTRAMUSCULAR; SOFT TISSUE EVERY 24 HOURS
Status: COMPLETED | OUTPATIENT
Start: 2021-09-03 | End: 2021-09-04

## 2021-09-03 RX ORDER — SODIUM CHLORIDE, SODIUM LACTATE, POTASSIUM CHLORIDE, CALCIUM CHLORIDE 600; 310; 30; 20 MG/100ML; MG/100ML; MG/100ML; MG/100ML
INJECTION, SOLUTION INTRAVENOUS CONTINUOUS
Status: DISCONTINUED | OUTPATIENT
Start: 2021-09-03 | End: 2021-09-05 | Stop reason: HOSPADM

## 2021-09-03 RX ORDER — NIFEDIPINE 10 MG/1
10 CAPSULE ORAL EVERY 8 HOURS
Status: DISCONTINUED | OUTPATIENT
Start: 2021-09-03 | End: 2021-09-04

## 2021-09-03 RX ADMIN — SODIUM CHLORIDE, POTASSIUM CHLORIDE, SODIUM LACTATE AND CALCIUM CHLORIDE 500 ML: 600; 310; 30; 20 INJECTION, SOLUTION INTRAVENOUS at 23:00

## 2021-09-03 RX ADMIN — SODIUM CHLORIDE, POTASSIUM CHLORIDE, SODIUM LACTATE AND CALCIUM CHLORIDE: 600; 310; 30; 20 INJECTION, SOLUTION INTRAVENOUS at 23:31

## 2021-09-03 RX ADMIN — ONDANSETRON 4 MG: 2 INJECTION INTRAMUSCULAR; INTRAVENOUS at 23:47

## 2021-09-03 RX ADMIN — BETAMETHASONE SODIUM PHOSPHATE AND BETAMETHASONE ACETATE 12 MG: 3; 3 INJECTION, SUSPENSION INTRA-ARTICULAR; INTRALESIONAL; INTRAMUSCULAR at 16:43

## 2021-09-03 RX ADMIN — NIFEDIPINE 10 MG: 10 CAPSULE ORAL at 18:30

## 2021-09-03 RX ADMIN — FENTANYL CITRATE 100 MCG: 50 INJECTION, SOLUTION INTRAMUSCULAR; INTRAVENOUS at 18:31

## 2021-09-03 RX ADMIN — NIFEDIPINE 10 MG: 10 CAPSULE ORAL at 16:42

## 2021-09-03 RX ADMIN — TERBUTALINE SULFATE 0.25 MG: 1 INJECTION SUBCUTANEOUS at 23:21

## 2021-09-03 RX ADMIN — FENTANYL CITRATE 100 MCG: 50 INJECTION, SOLUTION INTRAMUSCULAR; INTRAVENOUS at 21:51

## 2021-09-03 ASSESSMENT — LIFESTYLE VARIABLES
TOTAL SCORE: 0
CONSUMPTION TOTAL: INCOMPLETE
EVER_SMOKED: YES
TOTAL SCORE: 0
ALCOHOL_USE: NO
EVER HAD A DRINK FIRST THING IN THE MORNING TO STEADY YOUR NERVES TO GET RID OF A HANGOVER: NO
HAVE YOU EVER FELT YOU SHOULD CUT DOWN ON YOUR DRINKING: NO
HAVE PEOPLE ANNOYED YOU BY CRITICIZING YOUR DRINKING: NO
TOTAL SCORE: 0
EVER FELT BAD OR GUILTY ABOUT YOUR DRINKING: NO
DOES PATIENT WANT TO STOP DRINKING: NO

## 2021-09-03 ASSESSMENT — PAIN DESCRIPTION - PAIN TYPE
TYPE: ACUTE PAIN

## 2021-09-03 ASSESSMENT — FIBROSIS 4 INDEX: FIB4 SCORE: 0.57

## 2021-09-03 ASSESSMENT — PATIENT HEALTH QUESTIONNAIRE - PHQ9
2. FEELING DOWN, DEPRESSED, IRRITABLE, OR HOPELESS: NOT AT ALL
SUM OF ALL RESPONSES TO PHQ9 QUESTIONS 1 AND 2: 0
1. LITTLE INTEREST OR PLEASURE IN DOING THINGS: NOT AT ALL

## 2021-09-03 ASSESSMENT — COPD QUESTIONNAIRES
DURING THE PAST 4 WEEKS HOW MUCH DID YOU FEEL SHORT OF BREATH: NONE/LITTLE OF THE TIME
DO YOU EVER COUGH UP ANY MUCUS OR PHLEGM?: NO/ONLY WITH OCCASIONAL COLDS OR INFECTIONS
HAVE YOU SMOKED AT LEAST 100 CIGARETTES IN YOUR ENTIRE LIFE: NO/DON'T KNOW

## 2021-09-03 ASSESSMENT — PAIN SCALES - GENERAL: PAINLEVEL: 7
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
## 2021-09-03 NOTE — H&P
History and Physical    Aleyda Verma is a 36 y.o. female  at 32w2d who presents for  uterine contractions and abdominal pain.  She was found to be negative on amnio sure however continued to have painful and irregular contractions throughout the 2+ hours of monitoring in triage.  She was closed on examination.    Subjective:   CTXs: positive   Pain: positive  LOF: negative  Vaginal bleeding: negative   Fetal movement: positive    ROS: Pertinent positives documented in HPI and all other systems reviewed & are negative    OB History    Para Term  AB Living   4 2 2   1 2   SAB TAB Ectopic Molar Multiple Live Births   1   0     2      # Outcome Date GA Lbr Tylor/2nd Weight Sex Delivery Anes PTL Lv   4 Current            3 Term 19 38w4d  2.22 kg (4 lb 14.3 oz) F CS-LTranv Spinal  MEAGHAN      Birth Comments: SGA   2 SAB            1 Term 07/04/10 40w0d  3.118 kg (6 lb 14 oz) M CS-LTranv   MEAGHAN      Complications: Cephalopelvic Disproportion       PGYNHx:   13/28/3  Denies hx of STIs  Denies hx of abnormal paps, last 2019 NILM    No past medical history on file.    Past Surgical History:   Procedure Laterality Date   • ABDOMINOPLASTY     • GASTRIC BYPASS LAPAROSCOPIC     • MAMMOPLASTY AUGMENTATION     • PRIMARY C SECTION      c/s x2   • REPAIR PERFORATED ULCER           Current Facility-Administered Medications:   •  betamethasone acetate-betamethasone sodium phosphate (CELESTONE) injection 12 mg, 12 mg, Intramuscular, Q24HR, Konrad Adamsst, D.O.  •  NIFEdipine IR (PROCARDIA) capsule 10 mg, 10 mg, Oral, Q8HRS, Konrad Adamsst, D.O.    Allergies: Sulfa drugs    Social History     Socioeconomic History   • Marital status:      Spouse name: Not on file   • Number of children: Not on file   • Years of education: Not on file   • Highest education level: Not on file   Occupational History   • Not on file   Tobacco Use   • Smoking status: Never Smoker   • Smokeless tobacco: Never Used  "  Vaping Use   • Vaping Use: Never used   Substance and Sexual Activity   • Alcohol use: Not Currently   • Drug use: Never   • Sexual activity: Yes     Partners: Male   Other Topics Concern   • Not on file   Social History Narrative   • Not on file     Social Determinants of Health     Financial Resource Strain:    • Difficulty of Paying Living Expenses:    Food Insecurity:    • Worried About Running Out of Food in the Last Year:    • Ran Out of Food in the Last Year:    Transportation Needs:    • Lack of Transportation (Medical):    • Lack of Transportation (Non-Medical):    Physical Activity:    • Days of Exercise per Week:    • Minutes of Exercise per Session:    Stress:    • Feeling of Stress :    Social Connections:    • Frequency of Communication with Friends and Family:    • Frequency of Social Gatherings with Friends and Family:    • Attends [employer] Services:    • Active Member of Clubs or Organizations:    • Attends Club or Organization Meetings:    • Marital Status:    Intimate Partner Violence:    • Fear of Current or Ex-Partner:    • Emotionally Abused:    • Physically Abused:    • Sexually Abused:        Objective:      /59   Pulse 91   Temp 36.6 °C (97.9 °F) (Temporal)   Resp 16   Ht 1.727 m (5' 8\")   Wt 111 kg (245 lb)     General:   no acute distress, alert, cooperative   Skin:   normal   HEENT:  PERRLA   Lungs:   CTA bilateral   Heart:   chest is clear without rales or wheezing, no pedal edema   Abdomen:   soft, gravid, NT   EFW:  2000   Pelvis:  adequate with gynecoid pelvis   FHT:     Uterine Size: S=D   Presentations: Unsure   Cervix: closed     Lab Review  Lab:      Recent Results (from the past 5880 hour(s))   COMP METABOLIC PANEL    Collection Time: 02/26/21 11:05 PM   Result Value Ref Range    Sodium 135 135 - 145 mEq/L    Potassium 3.5 3.5 - 5.3 mEq/L    Chloride 102 100 - 111 mEq/L    Co2 22 (L) 24 - 33 mEq/L    Anion Gap 11 5 - 16 mEq/L    Bun 17 7 - 27 mg/dL    Glucose 107 60 " - 159 mg/dL    Creatinine 0.85 <=1.11 mg/dL    GFR If Non African American >60 >=60 mL/min    GFR If African American >60 >=60 mL/min    Comment [id number] SEE NOTE    HEMOGLOBIN A1C    Collection Time: 04/09/21 10:27 AM   Result Value Ref Range    Glycohemoglobin 5.0 <=5.6 %    Est Avg Glucose 97 85 - 126 mg/dL   CALCIUM, SERUM    Collection Time: 04/09/21 10:27 AM   Result Value Ref Range    Calcium 9.1 8.8 - 10.5 mg/dL   ALBUMIN    Collection Time: 04/09/21 10:27 AM   Result Value Ref Range    Albumin 3.6 (L) 3.7 - 5.7 g/dL   MAGNESIUM    Collection Time: 04/09/21 10:27 AM   Result Value Ref Range    Magnesium 1.8 1.7 - 2.3 mg/dL   PROTEIN/CREAT RATIO URINE    Collection Time: 06/23/21 11:28 AM   Result Value Ref Range    Total Protein, Urine 8.0 0.0 - 15.0 mg/dL    Creatinine, Random Urine 116.49 mg/dL    Protein Creatinine Ratio 69 10 - 107 mg/g   Comp Metabolic Panel    Collection Time: 06/23/21 11:53 AM   Result Value Ref Range    Sodium 136 135 - 145 mmol/L    Potassium 4.4 3.6 - 5.5 mmol/L    Chloride 106 96 - 112 mmol/L    Co2 21 20 - 33 mmol/L    Anion Gap 9.0 7.0 - 16.0    Glucose 82 65 - 99 mg/dL    Bun 8 8 - 22 mg/dL    Creatinine 0.56 0.50 - 1.40 mg/dL    Calcium 9.2 8.5 - 10.5 mg/dL    AST(SGOT) 15 12 - 45 U/L    ALT(SGPT) 10 2 - 50 U/L    Alkaline Phosphatase 75 30 - 99 U/L    Total Bilirubin 0.3 0.1 - 1.5 mg/dL    Albumin 3.6 3.2 - 4.9 g/dL    Total Protein 6.8 6.0 - 8.2 g/dL    Globulin 3.2 1.9 - 3.5 g/dL    A-G Ratio 1.1 g/dL   CBC WITH DIFFERENTIAL    Collection Time: 06/23/21 11:53 AM   Result Value Ref Range    WBC 9.6 4.8 - 10.8 K/uL    RBC 3.93 (L) 4.20 - 5.40 M/uL    Hemoglobin 11.8 (L) 12.0 - 16.0 g/dL    Hematocrit 36.6 (L) 37.0 - 47.0 %    MCV 93.1 81.4 - 97.8 fL    MCH 30.0 27.0 - 33.0 pg    MCHC 32.2 (L) 33.6 - 35.0 g/dL    RDW 47.5 35.9 - 50.0 fL    Platelet Count 289 164 - 446 K/uL    MPV 11.0 9.0 - 12.9 fL    Neutrophils-Polys 80.90 (H) 44.00 - 72.00 %    Lymphocytes 14.40 (L) 22.00 -  41.00 %    Monocytes 3.80 0.00 - 13.40 %    Eosinophils 0.40 0.00 - 6.90 %    Basophils 0.10 0.00 - 1.80 %    Immature Granulocytes 0.40 0.00 - 0.90 %    Nucleated RBC 0.00 /100 WBC    Neutrophils (Absolute) 7.76 (H) 2.00 - 7.15 K/uL    Lymphs (Absolute) 1.38 1.00 - 4.80 K/uL    Monos (Absolute) 0.36 0.00 - 0.85 K/uL    Eos (Absolute) 0.04 0.00 - 0.51 K/uL    Baso (Absolute) 0.01 0.00 - 0.12 K/uL    Immature Granulocytes (abs) 0.04 0.00 - 0.11 K/uL    NRBC (Absolute) 0.00 K/uL   AFP TETRA    Collection Time: 06/23/21 11:53 AM   Result Value Ref Range    AFP Value -Eia 45 ng/mL    AFP MOM Value 0.76     Ue3 Value 3.72 ng/mL    Ue3 Mom 1.42     Patient's hCG, 2nd Trimester 6283 IU/L    hCG MoM, 2nd Trimester 0.44     Sonya Value -Eia 113 pg/mL    Sonya Mom Value 0.72     Interpretation Screen Neg     [age]ASANTH [age] yr    Maternal Weight 241.0 lbs.     Gest. Age on Collection Date 22 wks, 0 days     Gestational Age Based On Ultrasound     Multiple Pregnancy Laura     Race Nonblack     Insulin Dependent Diabetes No     Smoking No     Family Hx NTD No     Family Hx of Aneuploidy No     Specimen See Note     EER Quad, Maternal Serum See Note    ESTIMATED GFR    Collection Time: 06/23/21 11:53 AM   Result Value Ref Range    GFR If African American >60 >60 mL/min/1.73 m 2    GFR If Non African American >60 >60 mL/min/1.73 m 2   POCT urinalysis device results    Collection Time: 07/08/21  5:55 PM   Result Value Ref Range    POC Color Yellow     POC Appearance Clear     POC Glucose Negative Negative mg/dL    POC Ketones Negative Negative mg/dL    POC Specific Gravity 1.025 1.005 - 1.030    POC Blood Negative Negative    POC Urine PH 6.5 5.0 - 8.0    POC Protein Negative Negative mg/dL    POC Nitrites Negative Negative    POC Leukocyte Esterase Negative Negative   HEMOGLOBIN A1C    Collection Time: 08/23/21  9:28 AM   Result Value Ref Range    Glycohemoglobin 5.0 4.0 - 5.6 %    Est Avg Glucose 97 mg/dL   AMNISURE ROM  ASSAY    Collection Time: 21  2:15 PM   Result Value Ref Range    AmniSure ROM Negative Negative   POCT urinalysis device results    Collection Time: 21  2:21 PM   Result Value Ref Range    POC Color Yellow     POC Appearance Clear     POC Glucose Negative Negative mg/dL    POC Ketones 15 (A) Negative mg/dL    POC Specific Gravity >=1.030 (A) 1.005 - 1.030    POC Blood Negative Negative    POC Urine PH 5.0 5.0 - 8.0    POC Protein 100 (A) Negative mg/dL    POC Nitrites Negative Negative    POC Leukocyte Esterase Negative Negative        Assessment:   Aleyda Verma at 32w2d  Labor status: with  contractions.     Obstetrical history significant for previous  x2, history of bariatric surgery, history of  Plan:     Admit to L&D   GBS unknown - will collect today  Fetal monitoring/toco  Betamethasone 12mg IM now and repeat in 24 hrs.  Procardia 10mg IR q 6-8hrs through the steroid window  Clear liquid diet  BS q 4hrs and cover with sliding scale PRN. Discussed with pt as she has not done glucola if she does have gestational diabetes, she is at risk for hyperglycemia.  PEC labs stat as her protein was +100 on UA

## 2021-09-04 ENCOUNTER — APPOINTMENT (OUTPATIENT)
Dept: RADIOLOGY | Facility: MEDICAL CENTER | Age: 36
DRG: 833 | End: 2021-09-04
Attending: OBSTETRICS & GYNECOLOGY
Payer: COMMERCIAL

## 2021-09-04 LAB
ALT SERPL-CCNC: 7 U/L (ref 2–50)
APPEARANCE UR: CLEAR
AST SERPL-CCNC: 13 U/L (ref 12–45)
BACTERIA GENITAL QL WET PREP: NORMAL
BASOPHILS # BLD AUTO: 0.1 % (ref 0–1.8)
BASOPHILS # BLD: 0.01 K/UL (ref 0–0.12)
BILIRUB UR QL STRIP.AUTO: NEGATIVE
COLOR UR: YELLOW
EOSINOPHIL # BLD AUTO: 0 K/UL (ref 0–0.51)
EOSINOPHIL NFR BLD: 0 % (ref 0–6.9)
ERYTHROCYTE [DISTWIDTH] IN BLOOD BY AUTOMATED COUNT: 41.8 FL (ref 35.9–50)
GLUCOSE BLD-MCNC: 110 MG/DL (ref 65–99)
GLUCOSE BLD-MCNC: 115 MG/DL (ref 65–99)
GLUCOSE BLD-MCNC: 96 MG/DL (ref 65–99)
GLUCOSE BLD-MCNC: 97 MG/DL (ref 65–99)
GLUCOSE UR STRIP.AUTO-MCNC: NEGATIVE MG/DL
GP B STREP DNA SPEC QL NAA+PROBE: NEGATIVE
HCT VFR BLD AUTO: 31 % (ref 37–47)
HGB BLD-MCNC: 10.3 G/DL (ref 12–16)
IMM GRANULOCYTES # BLD AUTO: 0.05 K/UL (ref 0–0.11)
IMM GRANULOCYTES NFR BLD AUTO: 0.6 % (ref 0–0.9)
KETONES UR STRIP.AUTO-MCNC: 15 MG/DL
LEUKOCYTE ESTERASE UR QL STRIP.AUTO: NEGATIVE
LIPASE SERPL-CCNC: 127 U/L (ref 11–82)
LIPASE SERPL-CCNC: 21 U/L (ref 11–82)
LYMPHOCYTES # BLD AUTO: 0.59 K/UL (ref 1–4.8)
LYMPHOCYTES NFR BLD: 7 % (ref 22–41)
MCH RBC QN AUTO: 29.2 PG (ref 27–33)
MCHC RBC AUTO-ENTMCNC: 33.2 G/DL (ref 33.6–35)
MCV RBC AUTO: 87.8 FL (ref 81.4–97.8)
MICRO URNS: ABNORMAL
MONOCYTES # BLD AUTO: 0.11 K/UL (ref 0–0.85)
MONOCYTES NFR BLD AUTO: 1.3 % (ref 0–13.4)
NEUTROPHILS # BLD AUTO: 7.64 K/UL (ref 2–7.15)
NEUTROPHILS NFR BLD: 91 % (ref 44–72)
NITRITE UR QL STRIP.AUTO: NEGATIVE
NRBC # BLD AUTO: 0 K/UL
NRBC BLD-RTO: 0 /100 WBC
PH UR STRIP.AUTO: 6.5 [PH] (ref 5–8)
PLATELET # BLD AUTO: 261 K/UL (ref 164–446)
PMV BLD AUTO: 10.9 FL (ref 9–12.9)
PROT UR QL STRIP: NEGATIVE MG/DL
RBC # BLD AUTO: 3.53 M/UL (ref 4.2–5.4)
RBC UR QL AUTO: NEGATIVE
SIGNIFICANT IND 70042: NORMAL
SITE SITE: NORMAL
SOURCE SOURCE: NORMAL
SP GR UR STRIP.AUTO: 1.01
UROBILINOGEN UR STRIP.AUTO-MCNC: 0.2 MG/DL
WBC # BLD AUTO: 8.4 K/UL (ref 4.8–10.8)

## 2021-09-04 PROCEDURE — 302151 K-PAD 14X20: Performed by: OBSTETRICS & GYNECOLOGY

## 2021-09-04 PROCEDURE — 99232 SBSQ HOSP IP/OBS MODERATE 35: CPT | Mod: 25 | Performed by: OBSTETRICS & GYNECOLOGY

## 2021-09-04 PROCEDURE — 83690 ASSAY OF LIPASE: CPT

## 2021-09-04 PROCEDURE — 302790 HCHG STAT ANTEPARTUM CARE, DAILY

## 2021-09-04 PROCEDURE — 700111 HCHG RX REV CODE 636 W/ 250 OVERRIDE (IP): Performed by: OBSTETRICS & GYNECOLOGY

## 2021-09-04 PROCEDURE — 85025 COMPLETE CBC W/AUTO DIFF WBC: CPT

## 2021-09-04 PROCEDURE — 770002 HCHG ROOM/CARE - OB PRIVATE (112)

## 2021-09-04 PROCEDURE — 700102 HCHG RX REV CODE 250 W/ 637 OVERRIDE(OP): Performed by: OBSTETRICS & GYNECOLOGY

## 2021-09-04 PROCEDURE — 96375 TX/PRO/DX INJ NEW DRUG ADDON: CPT

## 2021-09-04 PROCEDURE — 82962 GLUCOSE BLOOD TEST: CPT

## 2021-09-04 PROCEDURE — 302131 K PAD MOTOR: Performed by: OBSTETRICS & GYNECOLOGY

## 2021-09-04 PROCEDURE — 96376 TX/PRO/DX INJ SAME DRUG ADON: CPT

## 2021-09-04 PROCEDURE — 59025 FETAL NON-STRESS TEST: CPT | Mod: 26 | Performed by: OBSTETRICS & GYNECOLOGY

## 2021-09-04 PROCEDURE — 76775 US EXAM ABDO BACK WALL LIM: CPT

## 2021-09-04 PROCEDURE — C9113 INJ PANTOPRAZOLE SODIUM, VIA: HCPCS | Performed by: OBSTETRICS & GYNECOLOGY

## 2021-09-04 PROCEDURE — 36415 COLL VENOUS BLD VENIPUNCTURE: CPT

## 2021-09-04 PROCEDURE — 81003 URINALYSIS AUTO W/O SCOPE: CPT

## 2021-09-04 PROCEDURE — 84460 ALANINE AMINO (ALT) (SGPT): CPT

## 2021-09-04 PROCEDURE — 76705 ECHO EXAM OF ABDOMEN: CPT

## 2021-09-04 PROCEDURE — 84450 TRANSFERASE (AST) (SGOT): CPT

## 2021-09-04 PROCEDURE — 96372 THER/PROPH/DIAG INJ SC/IM: CPT

## 2021-09-04 PROCEDURE — 700105 HCHG RX REV CODE 258: Performed by: OBSTETRICS & GYNECOLOGY

## 2021-09-04 PROCEDURE — A9270 NON-COVERED ITEM OR SERVICE: HCPCS | Performed by: OBSTETRICS & GYNECOLOGY

## 2021-09-04 RX ORDER — DIPHENHYDRAMINE HCL 25 MG
25 TABLET ORAL EVERY 6 HOURS PRN
Status: DISCONTINUED | OUTPATIENT
Start: 2021-09-04 | End: 2021-09-05 | Stop reason: HOSPADM

## 2021-09-04 RX ORDER — MORPHINE SULFATE 4 MG/ML
2 INJECTION, SOLUTION INTRAMUSCULAR; INTRAVENOUS
Status: DISCONTINUED | OUTPATIENT
Start: 2021-09-04 | End: 2021-09-05 | Stop reason: HOSPADM

## 2021-09-04 RX ORDER — METRONIDAZOLE 500 MG/1
500 TABLET ORAL EVERY 12 HOURS
Status: DISCONTINUED | OUTPATIENT
Start: 2021-09-04 | End: 2021-09-05 | Stop reason: HOSPADM

## 2021-09-04 RX ORDER — SIMETHICONE 80 MG
80 TABLET,CHEWABLE ORAL 3 TIMES DAILY PRN
Status: DISCONTINUED | OUTPATIENT
Start: 2021-09-04 | End: 2021-09-05

## 2021-09-04 RX ORDER — NIFEDIPINE 10 MG/1
20 CAPSULE ORAL EVERY 6 HOURS
Status: DISCONTINUED | OUTPATIENT
Start: 2021-09-04 | End: 2021-09-04

## 2021-09-04 RX ORDER — NIFEDIPINE 10 MG/1
30 CAPSULE ORAL EVERY 8 HOURS
Status: DISCONTINUED | OUTPATIENT
Start: 2021-09-04 | End: 2021-09-04

## 2021-09-04 RX ORDER — ACETAMINOPHEN 500 MG
1000 TABLET ORAL EVERY 6 HOURS PRN
Status: DISCONTINUED | OUTPATIENT
Start: 2021-09-04 | End: 2021-09-05 | Stop reason: HOSPADM

## 2021-09-04 RX ORDER — NIFEDIPINE 10 MG/1
20 CAPSULE ORAL EVERY 8 HOURS
Status: DISCONTINUED | OUTPATIENT
Start: 2021-09-04 | End: 2021-09-04

## 2021-09-04 RX ORDER — SUCRALFATE 1 G/1
1 TABLET ORAL EVERY 6 HOURS
Status: DISCONTINUED | OUTPATIENT
Start: 2021-09-04 | End: 2021-09-05 | Stop reason: HOSPADM

## 2021-09-04 RX ORDER — PANTOPRAZOLE SODIUM 40 MG/10ML
40 INJECTION, POWDER, LYOPHILIZED, FOR SOLUTION INTRAVENOUS DAILY
Status: DISCONTINUED | OUTPATIENT
Start: 2021-09-04 | End: 2021-09-05 | Stop reason: HOSPADM

## 2021-09-04 RX ADMIN — SUCRALFATE 1 G: 1 TABLET ORAL at 18:01

## 2021-09-04 RX ADMIN — PANTOPRAZOLE SODIUM 40 MG: 40 INJECTION, POWDER, LYOPHILIZED, FOR SOLUTION INTRAVENOUS at 14:49

## 2021-09-04 RX ADMIN — BETAMETHASONE SODIUM PHOSPHATE AND BETAMETHASONE ACETATE 12 MG: 3; 3 INJECTION, SUSPENSION INTRA-ARTICULAR; INTRALESIONAL; INTRAMUSCULAR at 16:09

## 2021-09-04 RX ADMIN — SODIUM CHLORIDE, POTASSIUM CHLORIDE, SODIUM LACTATE AND CALCIUM CHLORIDE: 600; 310; 30; 20 INJECTION, SOLUTION INTRAVENOUS at 15:48

## 2021-09-04 RX ADMIN — MORPHINE SULFATE 2 MG: 4 INJECTION INTRAVENOUS at 19:31

## 2021-09-04 RX ADMIN — NIFEDIPINE 30 MG: 10 CAPSULE ORAL at 00:20

## 2021-09-04 RX ADMIN — NIFEDIPINE 20 MG: 10 CAPSULE ORAL at 11:42

## 2021-09-04 RX ADMIN — LIDOCAINE HYDROCHLORIDE 30 ML: 20 SOLUTION OROPHARYNGEAL at 00:01

## 2021-09-04 RX ADMIN — SUCRALFATE 1 G: 1 TABLET ORAL at 12:48

## 2021-09-04 RX ADMIN — FENTANYL CITRATE 100 MCG: 50 INJECTION, SOLUTION INTRAMUSCULAR; INTRAVENOUS at 05:25

## 2021-09-04 RX ADMIN — FENTANYL CITRATE 100 MCG: 50 INJECTION, SOLUTION INTRAMUSCULAR; INTRAVENOUS at 00:22

## 2021-09-04 RX ADMIN — SIMETHICONE 80 MG: 80 TABLET, CHEWABLE ORAL at 19:59

## 2021-09-04 RX ADMIN — SIMETHICONE 80 MG: 80 TABLET, CHEWABLE ORAL at 11:41

## 2021-09-04 RX ADMIN — NIFEDIPINE 20 MG: 10 CAPSULE ORAL at 06:42

## 2021-09-04 RX ADMIN — METRONIDAZOLE 500 MG: 500 TABLET ORAL at 14:49

## 2021-09-04 RX ADMIN — FENTANYL CITRATE 100 MCG: 50 INJECTION, SOLUTION INTRAMUSCULAR; INTRAVENOUS at 03:17

## 2021-09-04 RX ADMIN — SODIUM CHLORIDE, POTASSIUM CHLORIDE, SODIUM LACTATE AND CALCIUM CHLORIDE: 600; 310; 30; 20 INJECTION, SOLUTION INTRAVENOUS at 06:45

## 2021-09-04 RX ADMIN — ACETAMINOPHEN 1000 MG: 500 TABLET ORAL at 11:29

## 2021-09-04 ASSESSMENT — PAIN DESCRIPTION - PAIN TYPE
TYPE: ACUTE PAIN

## 2021-09-04 NOTE — PROGRESS NOTES
0700- Report received. POC discussed. Pt rates pain 5/10 states pain has not changed. Declines fentanyl at this time.     1045- Pt states pain has changed and is now sharp in lower abdomen.     1100- Dr. Vee to bedside. SVE - closed, no change. Wet prep done and sent to lab. Tylenol ordered and Carafate to rule out GI issue. Crackers offered. Renal US ordered.     1400- Renal US negative.  Clue cells noted on wet prep. + for BV. flagyl ordered by Dr. Vee.     1430- Report to NIKKI Hussein RN.

## 2021-09-04 NOTE — PROGRESS NOTES
1430 - Report received from LEATHA Vee, care assumed. Pt updated on plan of care and new medications. Pt states she is still feeling contractions but toco not picking any up. [place] repositioned. Meds given as ordered and lab at bedside for stat lab draw.  1620 - Meds given as ordered. Pt states pain tolerable at this time, states she feels like she nasir with it better now. Pt requesting results of tests and MD Vee notified and at bedside for review.   1518 - Per MD Vee will discontinue tocolytics and monitor uterine activity continuously with NST every shift.  1830 - Pt requesting pain medication and sleep medication. Discussed with MD Vee, pt ok to have Morphine q2 hours and benadryl for sleep.   1900 - Report given to LEATHA Vences

## 2021-09-04 NOTE — PROGRESS NOTES
S: Patient examined.  Noted to be resting comfortably in the bed in no acute distress.  When questioned, states still having pain mostly in the left upper quadrant but also voices that they are contractions.  States that she did get some relief from the fentanyl but the contractions still woke her up from sleep.  Admits to good fetal movement.    Examination  GEN: AAO in no acute distress resting comfortably.  Abdomen: No fundal tenderness.  Gravid.  Perineum: Normal vulva.  No bleeding noted.  SVE: Closed internal os.  Cervix long and thick.  Extremities: SCDs in place    NST: baseline 120 / mod artemio / reactive with no decelerations  [place]: irritability for the most part. approx 15min of regular q 2 contractions around the 10 pm hour.     Assessment and plan  36 y.o.  at 32w3d here with  contractions.  Status post betamethasone for fetal lung maturity which was given at 1645 on .  Status post nifedipine 20 mg p.o.  Patient due for second dose in about 45 minutes and will give 30 mg p.o.  Has been receiving fentanyl 100 mcg as needed for pain.  Status post 1 dose of terbutaline 0.25 mg  Has now received Zofran IV as well as GI cocktail.    Advised patient that we will recheck status after redose of fentanyl and 30 mg of oral Procardia.  If still feeling pain, will recheck cervix.    We discussed that without cervical change and in the light of  status, would not make a decision to proceed for  lightly given the risks of prematurity.

## 2021-09-04 NOTE — PROGRESS NOTES
1630: Assumed care of pt. AAO, pt to S230, POC discussed, pt verbalized understanding.   1900: Report given to Lidia ZHANG, pt in stable condition

## 2021-09-04 NOTE — PROGRESS NOTES
Presented to patient room because patient was complaining of increased severity of contractions.    SVE: Closed, long, floating    NST: Baseline 145/ moderate variability positive accelerations/no decelerations  [place]: Irregular subtle contractions noted throughout    Assessment and plan  We will medicate patient with fentanyl and one additional 10 mg dose of nifedipine.  Briefly discussed with patient and her mother the thought processes behind the medications that we are giving.  Advised that I cannot say whether or not she will be in  labor however the next 48 hours will be very telling.  Advised that the betamethasone as a precautionary measure given if the patient is at risk of progressing into  labor within the next 1 to 2 weeks.  Reassess as needed

## 2021-09-04 NOTE — PROGRESS NOTES
Aleyda Verma   32w3d  Admission DX:  uterine contractions [O47.9]    Date of Admission: 9/3/2021      Subjective:   Patient still experiencing pain.  She describes the pain as epigastric that wraps around to her back.  She states that she has been nauseous and has not had much of an appetite.  Denies any vomiting.  No fevers or chills.  She does feel occasional uterine contractions but states that this is not the pain that she is experiencing.  She does admit to increased vaginal discharge.  Denies any burning with urination.  Denies any history of kidney stones.  Denies any issues with gallbladder in the past.  She has had a perforated ulcer in the past but states that this pain does not feel like that.    LOF: no  vaginal Bleeding: no  fetal movement: normal    Objective:   Vitals:    21 0149 21 0500 21 0814 21 1200   BP:  120/72 110/67 114/68   Pulse: 96 83 72 76   Resp:  18 18    Temp:  36.6 °C (97.9 °F) 36.6 °C (97.9 °F)    TempSrc:  Temporal Temporal    SpO2:       Weight:       Height:         NST: Baseline 150 with moderate variability, accelerations present, no decelerations noted.  Reactive NST.  No current uterine activity noted.  Gen: Alert and oriented x3 with no acute distress.  Membranes: ruptured: no-AmniSure negative  Abdomen: gravid, soft, NT. No guarding or rebound tenderness.   Ext: SCDs on, Nt, no cyanosis or clubbing    Meds:     Current Facility-Administered Medications:   •  NIFEdipine IR (PROCARDIA) capsule 20 mg, 20 mg, Oral, Q6HRS, Konrad Grant D.OPeg, 20 mg at 21 1142  •  acetaminophen (TYLENOL) tablet 1,000 mg, 1,000 mg, Oral, Q6HRS PRN, ZAY GradyOPeg, 1,000 mg at 21 1129  •  simethicone (MYLICON) chewable tab 80 mg, 80 mg, Oral, TID PRN, ZAY GradyOPeg, 80 mg at 21 1141  •  sucralfate (CARAFATE) tablet 1 g, 1 g, Oral, Q6HRS, Tasneem Vee D.O., 1 g at 21 1248  •  metroNIDAZOLE (FLAGYL) tablet 500 mg, 500 mg,  Oral, Q12HRS, Tasneem Vee D.O.  •  pantoprazole (PROTONIX) injection 40 mg, 40 mg, Intravenous, DAILY, Tasneem Vee D.O.  •  betamethasone acetate-betamethasone sodium phosphate (CELESTONE) injection 12 mg, 12 mg, Intramuscular, Q24HR, Konrad Grant D.O., 12 mg at 09/03/21 1643  •  fentaNYL (SUBLIMAZE) injection 100 mcg, 100 mcg, Intravenous, Q HOUR PRN, Konrad Grant D.O., 100 mcg at 09/04/21 0525  •  lactated ringers infusion, , Intravenous, Continuous, Konrad Grant D.O., Last Rate: 125 mL/hr at 09/04/21 0645, New Bag at 09/04/21 0645  •  hyoscyamine-maalox plus-lidocaine viscous (GI COCKTAIL) oral susp 30 mL, 30 mL, Oral, Q6HRS PRN, Konrad Grant D.O., 30 mL at 09/04/21 0001  •  ondansetron (ZOFRAN) syringe/vial injection 4 mg, 4 mg, Intravenous, Q6HRS PRN, Konrad Grant, D.O., 4 mg at 09/03/21 2347    Labs:    Lab:   Recent Results (from the past 72 hour(s))   PROTEIN/CREAT RATIO URINE    Collection Time: 09/03/21 12:00 AM   Result Value Ref Range    Total Protein, Urine 4.0 0.0 - 15.0 mg/dL    Creatinine, Random Urine 48.26 mg/dL    Protein Creatinine Ratio 83 10 - 107 mg/g   AMNISURE ROM ASSAY    Collection Time: 09/03/21  2:15 PM   Result Value Ref Range    AmniSure ROM Negative Negative   POCT urinalysis device results    Collection Time: 09/03/21  2:21 PM   Result Value Ref Range    POC Color Yellow     POC Appearance Clear     POC Glucose Negative Negative mg/dL    POC Ketones 15 (A) Negative mg/dL    POC Specific Gravity >=1.030 (A) 1.005 - 1.030    POC Blood Negative Negative    POC Urine PH 5.0 5.0 - 8.0    POC Protein 100 (A) Negative mg/dL    POC Nitrites Negative Negative    POC Leukocyte Esterase Negative Negative   CBC WITHOUT DIFFERENTIAL    Collection Time: 09/03/21  3:59 PM   Result Value Ref Range    WBC 10.0 4.8 - 10.8 K/uL    RBC 4.12 (L) 4.20 - 5.40 M/uL    Hemoglobin 11.8 (L) 12.0 - 16.0 g/dL    Hematocrit 36.2 (L) 37.0 - 47.0 %    MCV 87.9 81.4 - 97.8 fL    MCH 28.6 27.0 - 33.0  pg    MCHC 32.6 (L) 33.6 - 35.0 g/dL    RDW 42.5 35.9 - 50.0 fL    Platelet Count 321 164 - 446 K/uL    MPV 10.3 9.0 - 12.9 fL   Comp Metabolic Panel    Collection Time: 09/03/21  3:59 PM   Result Value Ref Range    Sodium 136 135 - 145 mmol/L    Potassium 4.3 3.6 - 5.5 mmol/L    Chloride 101 96 - 112 mmol/L    Co2 20 20 - 33 mmol/L    Anion Gap 15.0 7.0 - 16.0    Glucose 82 65 - 99 mg/dL    Bun 8 8 - 22 mg/dL    Creatinine 0.61 0.50 - 1.40 mg/dL    Calcium 9.7 8.5 - 10.5 mg/dL    AST(SGOT) 14 12 - 45 U/L    ALT(SGPT) 10 2 - 50 U/L    Alkaline Phosphatase 105 (H) 30 - 99 U/L    Total Bilirubin 0.4 0.1 - 1.5 mg/dL    Albumin 3.4 3.2 - 4.9 g/dL    Total Protein 7.0 6.0 - 8.2 g/dL    Globulin 3.6 (H) 1.9 - 3.5 g/dL    A-G Ratio 0.9 g/dL   LDH    Collection Time: 09/03/21  3:59 PM   Result Value Ref Range    LDH Total 134 107 - 266 U/L   URIC ACID    Collection Time: 09/03/21  3:59 PM   Result Value Ref Range    Uric Acid 4.8 1.9 - 8.2 mg/dL   ESTIMATED GFR    Collection Time: 09/03/21  3:59 PM   Result Value Ref Range    GFR If African American >60 >60 mL/min/1.73 m 2    GFR If Non African American >60 >60 mL/min/1.73 m 2   POCT glucose device results    Collection Time: 09/03/21  5:36 PM   Result Value Ref Range    Glucose - Accu-Ck 75 65 - 99 mg/dL   POCT glucose device results    Collection Time: 09/03/21  9:48 PM   Result Value Ref Range    Glucose - Accu-Ck 111 (H) 65 - 99 mg/dL   POCT glucose device results    Collection Time: 09/04/21  5:51 AM   Result Value Ref Range    Glucose - Accu-Ck 110 (H) 65 - 99 mg/dL   LIPASE    Collection Time: 09/04/21  5:57 AM   Result Value Ref Range    Lipase 127 (H) 11 - 82 U/L   CBC WITH DIFFERENTIAL    Collection Time: 09/04/21  5:57 AM   Result Value Ref Range    WBC 8.4 4.8 - 10.8 K/uL    RBC 3.53 (L) 4.20 - 5.40 M/uL    Hemoglobin 10.3 (L) 12.0 - 16.0 g/dL    Hematocrit 31.0 (L) 37.0 - 47.0 %    MCV 87.8 81.4 - 97.8 fL    MCH 29.2 27.0 - 33.0 pg    MCHC 33.2 (L) 33.6 - 35.0  g/dL    RDW 41.8 35.9 - 50.0 fL    Platelet Count 261 164 - 446 K/uL    MPV 10.9 9.0 - 12.9 fL    Neutrophils-Polys 91.00 (H) 44.00 - 72.00 %    Lymphocytes 7.00 (L) 22.00 - 41.00 %    Monocytes 1.30 0.00 - 13.40 %    Eosinophils 0.00 0.00 - 6.90 %    Basophils 0.10 0.00 - 1.80 %    Immature Granulocytes 0.60 0.00 - 0.90 %    Nucleated RBC 0.00 /100 WBC    Neutrophils (Absolute) 7.64 (H) 2.00 - 7.15 K/uL    Lymphs (Absolute) 0.59 (L) 1.00 - 4.80 K/uL    Monos (Absolute) 0.11 0.00 - 0.85 K/uL    Eos (Absolute) 0.00 0.00 - 0.51 K/uL    Baso (Absolute) 0.01 0.00 - 0.12 K/uL    Immature Granulocytes (abs) 0.05 0.00 - 0.11 K/uL    NRBC (Absolute) 0.00 K/uL   URINALYSIS    Collection Time: 21  8:18 AM    Specimen: Urine, Clean Catch   Result Value Ref Range    Color Yellow     Character Clear     Specific Gravity 1.007 <1.035    Ph 6.5 5.0 - 8.0    Glucose Negative Negative mg/dL    Ketones 15 (A) Negative mg/dL    Protein Negative Negative mg/dL    Bilirubin Negative Negative    Urobilinogen, Urine 0.2 Negative    Nitrite Negative Negative    Leukocyte Esterase Negative Negative    Occult Blood Negative Negative    Micro Urine Req see below    POCT glucose device results    Collection Time: 21  9:48 AM   Result Value Ref Range    Glucose - Accu-Ck 97 65 - 99 mg/dL   WET PREP    Collection Time: 21 11:05 AM    Specimen: Genital   Result Value Ref Range    Significant Indicator NEG     Source GEN     Site GENITAL     Wet Prep For Parasites       Many clue cells seen.  Moderate WBCs seen.  No motile Trichomonas seen.  No yeast.         Assessment:  36 y.o.  @ 32w3d   uterine contractions [O47.9]- received BMZ dose x 1. Repeat dose due at 1600. Will dc tocolytics after second BMZ dose.   RUQ pain- RUQ US clear. Renal US clear. UA WNL. Lipase slightly elevated- will repeat. Consider MRI if no improvement with pain.   Wet prep demonstrated BV- flagyl started  Hx of perforated gastric ulcer-  carafate and protonix given as well as simethicone as gas was visualized on RUQ US. Tylenol for pain.

## 2021-09-04 NOTE — PROGRESS NOTES
S: pt examined for complaint of constant abdominal pain, on her phone.   States this pain is nowhere near the severity of when she had a perforated ulcer 1 year ago. Gestures to the whole abdomen where she says there is pain and states it is constant but shoots up toward her upper abdomen.    Vitals:    21 0000   BP: 122/68   Pulse: (!) 104   Resp:    Temp: 36.7 °C (98.1 °F)   SpO2: 100%     Exam:   Gen: AAO in NAD  Abd: gravid, no fundal tenderness, bowel sounds positive in all 4 quadrants. Soft and nontender x 4.   SVE: closed internal os, long, thick, floating.  Ext: SCD in place    NST: 130 / mod artemio / reactive without decelerations category I  [place]: irritability    AP  36 y.o.  at 32w3d here for rule out  labor  S/p one dose betamethasone at 1645 on . For second dose   Currently on nifedipine 20 mg q6hrs. S/P 30 mg loading dose    Abd US ordered to rule out gallstone/pancreas origin  Repeat CBC with diff  Stat UA  Lipase    To follow closely

## 2021-09-04 NOTE — PROGRESS NOTES
"1900: Report received from CIELO Contreras RN.     2255: This RN talked to Dr. Grant at nurse's station to give a pt update; Pt liz ever 2-4 minutes and also complaining of constant upper abdomen pain that she describes as achy. Orders to give a 500ml bolus and increase maintenance fluid to 125 ml/hr following the bolus.     2306: Dr. Grant ordered terbutaline to be given; RN notified provider of pt's history of asthma, pt states \"I had some problems as a kid, but I have not used an inhaler in 4 years\"; per provider, it is okay to give terbutaline.     0008: Dr. Grant at bedside to assess pt; SVE 0 and unchanged; orders to give 30 mg IR procardia and another dose of fentanyl for the pain and continue with fentanyl Q3 hours as needed; all questions answered at this time.     2042-4544: This RN at bedside assessing pt. Pt used the call light to ask for her nurse. At bedside, pt states she feels her contractions are getting worse. RN remained at bedside palpating pt's abdomen and adjusting toco to  contractions; contractions difficult to palpate but some palpate mild and are difficult to trace.     0458: This RN called Dr. Grant to notify her of pt's change in pain. Pt reports after returning from the bathroom, she is feeling a 10/10 constant pain in her abdomen; abdomen palpates soft but is tender to palpation, no vaginal bleeding or leaking of fluid.     0455: Pt called out asking for her nurse; RN immediately at bedside; pt states \"I am having constant pain all over my abdomen after I got up to go to the bathroom\" pt rates her pain as a 10/10. RN assess pt; abdomen soft but tender to touch; no vaginal bleeding, no LOF, VSS.    0515: Dr. Grant at bedside to assess pt; SVE 0 and unchanged; pt in bed and answering questions calmly; able to hold conversation without grimacing; per Dr. Grant, pt can have another dose of fentanyl.      0526: Ultrasound tech at bedside for abdominal ultrasound; pt holding a normal " "conversation without grimacing, and able to follow directions.     0530: EFM taken off to accommodate abdominal ultrasound.     0536: Per Dr. Grant, pt can be taken off continuous monitoring for 2 hours.     0539: After monitors are taken off pt states \"that feels a lot better\" and now rates her pain as a 5/10.     0700: Report given to day shift RN; POC discussed.   "

## 2021-09-05 VITALS
BODY MASS INDEX: 37.13 KG/M2 | TEMPERATURE: 98 F | HEIGHT: 68 IN | DIASTOLIC BLOOD PRESSURE: 72 MMHG | RESPIRATION RATE: 16 BRPM | SYSTOLIC BLOOD PRESSURE: 114 MMHG | WEIGHT: 245 LBS | HEART RATE: 82 BPM | OXYGEN SATURATION: 93 %

## 2021-09-05 LAB
ALBUMIN SERPL BCP-MCNC: 2.9 G/DL (ref 3.2–4.9)
ALBUMIN/GLOB SERPL: 0.9 G/DL
ALP SERPL-CCNC: 90 U/L (ref 30–99)
ALT SERPL-CCNC: 7 U/L (ref 2–50)
ANION GAP SERPL CALC-SCNC: 11 MMOL/L (ref 7–16)
AST SERPL-CCNC: 11 U/L (ref 12–45)
BILIRUB SERPL-MCNC: 0.3 MG/DL (ref 0.1–1.5)
BUN SERPL-MCNC: 6 MG/DL (ref 8–22)
CALCIUM SERPL-MCNC: 8.3 MG/DL (ref 8.5–10.5)
CHLORIDE SERPL-SCNC: 105 MMOL/L (ref 96–112)
CO2 SERPL-SCNC: 21 MMOL/L (ref 20–33)
CREAT SERPL-MCNC: 0.48 MG/DL (ref 0.5–1.4)
ERYTHROCYTE [DISTWIDTH] IN BLOOD BY AUTOMATED COUNT: 43.9 FL (ref 35.9–50)
GLOBULIN SER CALC-MCNC: 3.1 G/DL (ref 1.9–3.5)
GLUCOSE BLD-MCNC: 103 MG/DL (ref 65–99)
GLUCOSE SERPL-MCNC: 96 MG/DL (ref 65–99)
HCT VFR BLD AUTO: 31.7 % (ref 37–47)
HGB BLD-MCNC: 10.4 G/DL (ref 12–16)
MCH RBC QN AUTO: 29.3 PG (ref 27–33)
MCHC RBC AUTO-ENTMCNC: 32.8 G/DL (ref 33.6–35)
MCV RBC AUTO: 89.3 FL (ref 81.4–97.8)
PLATELET # BLD AUTO: 282 K/UL (ref 164–446)
PMV BLD AUTO: 10.9 FL (ref 9–12.9)
POTASSIUM SERPL-SCNC: 4.2 MMOL/L (ref 3.6–5.5)
PROT SERPL-MCNC: 6 G/DL (ref 6–8.2)
RBC # BLD AUTO: 3.55 M/UL (ref 4.2–5.4)
SODIUM SERPL-SCNC: 137 MMOL/L (ref 135–145)
WBC # BLD AUTO: 9.3 K/UL (ref 4.8–10.8)

## 2021-09-05 PROCEDURE — 85027 COMPLETE CBC AUTOMATED: CPT

## 2021-09-05 PROCEDURE — A9270 NON-COVERED ITEM OR SERVICE: HCPCS

## 2021-09-05 PROCEDURE — 700105 HCHG RX REV CODE 258: Performed by: OBSTETRICS & GYNECOLOGY

## 2021-09-05 PROCEDURE — 700111 HCHG RX REV CODE 636 W/ 250 OVERRIDE (IP): Performed by: OBSTETRICS & GYNECOLOGY

## 2021-09-05 PROCEDURE — 59025 FETAL NON-STRESS TEST: CPT

## 2021-09-05 PROCEDURE — 36415 COLL VENOUS BLD VENIPUNCTURE: CPT

## 2021-09-05 PROCEDURE — A9270 NON-COVERED ITEM OR SERVICE: HCPCS | Performed by: OBSTETRICS & GYNECOLOGY

## 2021-09-05 PROCEDURE — 700102 HCHG RX REV CODE 250 W/ 637 OVERRIDE(OP): Performed by: OBSTETRICS & GYNECOLOGY

## 2021-09-05 PROCEDURE — 80053 COMPREHEN METABOLIC PANEL: CPT

## 2021-09-05 PROCEDURE — 700102 HCHG RX REV CODE 250 W/ 637 OVERRIDE(OP)

## 2021-09-05 PROCEDURE — C9113 INJ PANTOPRAZOLE SODIUM, VIA: HCPCS | Performed by: OBSTETRICS & GYNECOLOGY

## 2021-09-05 PROCEDURE — 82962 GLUCOSE BLOOD TEST: CPT

## 2021-09-05 RX ORDER — ACETAMINOPHEN 500 MG
1000 TABLET ORAL EVERY 6 HOURS PRN
Qty: 30 TABLET | Refills: 0 | Status: ON HOLD | OUTPATIENT
Start: 2021-09-05 | End: 2021-10-15

## 2021-09-05 RX ORDER — SIMETHICONE 80 MG
160 TABLET,CHEWABLE ORAL EVERY 6 HOURS PRN
Status: DISCONTINUED | OUTPATIENT
Start: 2021-09-05 | End: 2021-09-05 | Stop reason: HOSPADM

## 2021-09-05 RX ORDER — DOCUSATE SODIUM 100 MG/1
100 CAPSULE, LIQUID FILLED ORAL 2 TIMES DAILY
Status: DISCONTINUED | OUTPATIENT
Start: 2021-09-05 | End: 2021-09-05 | Stop reason: HOSPADM

## 2021-09-05 RX ORDER — SIMETHICONE 80 MG
160 TABLET,CHEWABLE ORAL EVERY 6 HOURS PRN
Qty: 30 TABLET | Refills: 3 | Status: ON HOLD | OUTPATIENT
Start: 2021-09-05 | End: 2021-10-15

## 2021-09-05 RX ORDER — METRONIDAZOLE 250 MG/1
250 TABLET ORAL EVERY 8 HOURS
Qty: 30 TABLET | Refills: 0 | Status: SHIPPED | OUTPATIENT
Start: 2021-09-05 | End: 2021-09-15

## 2021-09-05 RX ORDER — FAMOTIDINE 20 MG/1
20 TABLET, FILM COATED ORAL 2 TIMES DAILY
Qty: 60 TABLET | Refills: 0 | Status: ON HOLD | OUTPATIENT
Start: 2021-09-05 | End: 2021-10-15

## 2021-09-05 RX ORDER — PSEUDOEPHEDRINE HCL 30 MG
100 TABLET ORAL 2 TIMES DAILY
Qty: 60 CAPSULE | Refills: 0 | Status: ON HOLD | OUTPATIENT
Start: 2021-09-05 | End: 2021-10-15

## 2021-09-05 RX ADMIN — METRONIDAZOLE 500 MG: 500 TABLET ORAL at 06:05

## 2021-09-05 RX ADMIN — SODIUM CHLORIDE, POTASSIUM CHLORIDE, SODIUM LACTATE AND CALCIUM CHLORIDE: 600; 310; 30; 20 INJECTION, SOLUTION INTRAVENOUS at 08:37

## 2021-09-05 RX ADMIN — SIMETHICONE 160 MG: 80 TABLET, CHEWABLE ORAL at 00:16

## 2021-09-05 RX ADMIN — DIPHENHYDRAMINE HYDROCHLORIDE 25 MG: 25 TABLET ORAL at 00:17

## 2021-09-05 RX ADMIN — SUCRALFATE 1 G: 1 TABLET ORAL at 06:42

## 2021-09-05 RX ADMIN — SODIUM CHLORIDE, POTASSIUM CHLORIDE, SODIUM LACTATE AND CALCIUM CHLORIDE: 600; 310; 30; 20 INJECTION, SOLUTION INTRAVENOUS at 00:35

## 2021-09-05 RX ADMIN — DOCUSATE SODIUM 100 MG: 100 CAPSULE ORAL at 09:14

## 2021-09-05 RX ADMIN — SUCRALFATE 1 G: 1 TABLET ORAL at 11:49

## 2021-09-05 RX ADMIN — SIMETHICONE 160 MG: 80 TABLET, CHEWABLE ORAL at 08:36

## 2021-09-05 RX ADMIN — PANTOPRAZOLE SODIUM 40 MG: 40 INJECTION, POWDER, LYOPHILIZED, FOR SOLUTION INTRAVENOUS at 06:05

## 2021-09-05 RX ADMIN — ACETAMINOPHEN 1000 MG: 500 TABLET ORAL at 00:16

## 2021-09-05 ASSESSMENT — PATIENT HEALTH QUESTIONNAIRE - PHQ9
1. LITTLE INTEREST OR PLEASURE IN DOING THINGS: NOT AT ALL
2. FEELING DOWN, DEPRESSED, IRRITABLE, OR HOPELESS: NOT AT ALL
SUM OF ALL RESPONSES TO PHQ9 QUESTIONS 1 AND 2: 0

## 2021-09-05 NOTE — PROGRESS NOTES
1900) Report received from Aubrie WISDOM RN, POC discussed, assumed care of patient at this time  5) Assessment performed. Patient is a  EDC 10/27 which makes her 32.3 weeks. Patient denies any vaginal bleeding or LOF at this time. Patient has intermittent contractions at this time.  Patient has no abdomen tenderness. States positive fetal movement. EFM on patient to obtain reactive NST  0130) Patient resting comfortably in bed, no s/s of distress noted, respirations even and unlabored, safety precautions remain in place  0700) Report given to Sofy MCKEON RN, POC discussed

## 2021-09-05 NOTE — DISCHARGE SUMMARY
Discharge Summary:     Date of Admission: 9/3/2021  Date of Discharge: 21      Admitting diagnosis:    1. Pregnancy @ 32w4d    Discharge Diagnosis:   1. Pregnancy @ 32w4d  2. Possible GERD    Past Medical History:   Diagnosis Date   • Asthma     In childhood; hasn't used inhaler in 4 years per pt     OB History    Para Term  AB Living   4 2 2   1 2   SAB TAB Ectopic Molar Multiple Live Births   1   0     2      # Outcome Date GA Lbr Tylor/2nd Weight Sex Delivery Anes PTL Lv   4 Current            3 Term 19 38w4d  2.22 kg (4 lb 14.3 oz) F CS-LTranv Spinal  MEAGHAN      Birth Comments: SGA   2 SAB            1 Term 07/04/10 40w0d  3.118 kg (6 lb 14 oz) M CS-LTranv   MEAGHAN      Complications: Cephalopelvic Disproportion     Past Surgical History:   Procedure Laterality Date   • ABDOMINOPLASTY     • GASTRIC BYPASS LAPAROSCOPIC     • MAMMOPLASTY AUGMENTATION     • PRIMARY C SECTION      c/s x2   • REPAIR PERFORATED ULCER       Sulfa drugs    There is no problem list on file for this patient.      Hospital Course:   36 y.o. now , was admitted for the evaluation of epigastric pain.  Exacerbating factors deep breathing,  no alleviating factors.  Abdominal pain was associated with lack of appetite and some nausea.  Past medical history significant for mammoplasty augmentation, repair of a perforated ulcer, abdominoplasty, gastric bypass via laparoscopic procedure, primary  x2.  Per patient history this pain was unlike when she experienced with her previous both perforated ulcer.  Labs and imaging were nonspecific for any condition, hospital course was also significant for an elevated lipase level which normalized the following day.  Patient was discharged on pain medications, Flagyl, simethicone, and acid reflux modulators.  Patient was instructed to return to the should her condition worsen or she experiences vaginal fluid losses including blood loss.  Patient discharged in stable  condition.     Physical Exam:  Temp:  [36.4 °C (97.5 °F)-36.7 °C (98 °F)] 36.7 °C (98 °F)  Pulse:  [66-82] 82  Resp:  [16-18] 16  BP: (105-117)/(52-73) 114/72  Physical Exam  General: well appearing, in no acute distress  Chest/Breasts: No notable trauma   Abdomen: epigastric tenderness  Extremities: non edematous, calves nontender    Current Facility-Administered Medications   Medication Dose   • simethicone (MYLICON) chewable tab 160 mg  160 mg   • docusate sodium (COLACE) capsule 100 mg  100 mg   • acetaminophen (TYLENOL) tablet 1,000 mg  1,000 mg   • sucralfate (CARAFATE) tablet 1 g  1 g   • metroNIDAZOLE (FLAGYL) tablet 500 mg  500 mg   • pantoprazole (PROTONIX) injection 40 mg  40 mg   • morphine (pf) 4 mg/mL injection 2 mg  2 mg   • diphenhydrAMINE (BENADRYL) tablet/capsule 25 mg  25 mg   • lactated ringers infusion     • hyoscyamine-maalox plus-lidocaine viscous (GI COCKTAIL) oral susp 30 mL  30 mL   • ondansetron (ZOFRAN) syringe/vial injection 4 mg  4 mg       Recent Labs     09/03/21  1559 09/04/21  0557 09/05/21  1002   WBC 10.0 8.4 9.3   RBC 4.12* 3.53* 3.55*   HEMOGLOBIN 11.8* 10.3* 10.4*   HEMATOCRIT 36.2* 31.0* 31.7*   MCV 87.9 87.8 89.3   MCH 28.6 29.2 29.3   MCHC 32.6* 33.2* 32.8*   RDW 42.5 41.8 43.9   PLATELETCT 321 261 282   MPV 10.3 10.9 10.9         Activity/ Discharge Instructions::   Discharge to home  Pelvic Rest x 6 weeks  No heavy lifting x4 weeks  Call or come to ED for: heavy vaginal bleeding, fever >100.4, severe abdominal pain, severe headache, chest pain, shortness of breath,  N/V, incisional drainage, or other concerns.      Discharge Meds:   Current Outpatient Medications   Medication Sig Dispense Refill   • acetaminophen (TYLENOL) 500 MG Tab Take 2 Tablets by mouth every 6 hours as needed. 30 Tablet 0   • docusate sodium 100 MG Cap Take 100 mg by mouth 2 times a day. 60 Capsule 0   • simethicone (MYLICON) 80 MG Chew Tab Chew 2 Tablets every 6 hours as needed (bloating/gas). 30  Tablet 3   • famotidine (PEPCID) 20 MG Tab Take 1 Tablet by mouth 2 times a day. 60 Tablet 0   • metroNIDAZOLE (FLAGYL) 250 MG Tab Take 1 Tablet by mouth every 8 hours for 10 days. 30 Tablet 0       Deb Fung M.D.PhD

## 2021-09-05 NOTE — PROGRESS NOTES
Entry written on 9/5 - late entry:    Pt presents to L&D c/o abdominal pain. Pt is unsure if she's liz or if this is related to something else. She states the pain is constant but peaks every few minutes. Contractions palpated by RN - moderate intensity. Pt also is unsure if she's been leaking fluid for the past 2 days. Reports +FM and denies VB. Dr. Grant notified. Order received for SVE and amnisure. SVE=closed. Amnisure collected and sent to lab. Dr Grant at bedside. Order received for IV hydration and antepartum admission. Report given to Soha ZHANG.

## 2021-09-05 NOTE — PROGRESS NOTES
Report received from Vangie ZHANG. Pt reports +FM and reports worsening abdominal pain this morning - unsure if it's related to contractions - but she is generally uncomfortable. Pt reports last BM 4 days ago. Dr. Fung (resident MD) at bedside to assess pt. Colace ordered.     0920-Dr. Hilliard at bedside to assess pt. Labs ordered. If labs are WNL, pt may d/c with order for Pepcid and colace. Pt agreeable to plan and would like to go home.    1100-labs still pending. Order received from Dr. Hilliard to remove US&TOCO and pt can eat.     1245-Dr. Hilliard reviewed labwork. d/c order received from MD.  Dr. Fung at bedside to see pt. All questions answered. Prescriptions sent to pt's preferred pharmacy. D/c instructions reviewed with pt.

## 2021-09-21 ENCOUNTER — PATIENT MESSAGE (OUTPATIENT)
Dept: OBGYN | Facility: CLINIC | Age: 36
End: 2021-09-21

## 2021-09-21 NOTE — TELEPHONE ENCOUNTER
Called Pt but N/A, LVMTCB. If Pt calls back, please advise Pt if she would like to come in on 9/22/2021 regarding her concerns. Thank you.    ----- Message from Aleyda Verma sent at 9/21/2021 12:28 PM PDT -----  Regarding: Blood and mucus  Hi I have been running around all day and am a little concerned about the discharge/it has blood in it. I’m having minor cramping and contractions still. Should I see if I can get an appointment.

## 2021-09-28 ENCOUNTER — ROUTINE PRENATAL (OUTPATIENT)
Dept: OBGYN | Facility: CLINIC | Age: 36
End: 2021-09-28
Payer: COMMERCIAL

## 2021-09-28 VITALS — DIASTOLIC BLOOD PRESSURE: 69 MMHG | WEIGHT: 242 LBS | SYSTOLIC BLOOD PRESSURE: 133 MMHG | BODY MASS INDEX: 36.8 KG/M2

## 2021-09-28 DIAGNOSIS — O99.213 OBESITY COMPLICATING PREGNANCY IN THIRD TRIMESTER: ICD-10-CM

## 2021-09-28 DIAGNOSIS — O60.00 PRETERM LABOR WITHOUT DELIVERY: Primary | ICD-10-CM

## 2021-09-28 DIAGNOSIS — Z98.891 HX OF CESAREAN SECTION: ICD-10-CM

## 2021-09-28 DIAGNOSIS — O09.93 HIGH-RISK PREGNANCY SUPERVISION, THIRD TRIMESTER: ICD-10-CM

## 2021-09-28 LAB
NST ACOUSTIC STIMULATION: NO
NST ACTION NECESSARY: NORMAL
NST ASSESSMENT: NORMAL
NST BASELINE: NORMAL
NST INDICATIONS: NORMAL
NST OTHER DATA: NORMAL
NST READ BY: NORMAL
NST RETURN: NORMAL
NST UTERINE ACTIVITY: NO

## 2021-09-28 PROCEDURE — 90040 PR PRENATAL FOLLOW UP: CPT | Performed by: OBSTETRICS & GYNECOLOGY

## 2021-09-28 PROCEDURE — 87210 SMEAR WET MOUNT SALINE/INK: CPT | Performed by: OBSTETRICS & GYNECOLOGY

## 2021-09-28 ASSESSMENT — FIBROSIS 4 INDEX: FIB4 SCORE: 0.52

## 2021-09-28 NOTE — PROGRESS NOTES
S: Pt presents for routine OB follow up.  Patient is doing well currently.  Good fetal movements but has not been doing the kick counts properly.  We reviewed how to do kick counts over 2-hour timeframe.  No contractions or vaginal bleeding.she reports mucus discharge and possible leakage of fluid-states she has had some small amount of fluid come out of the vagina but she is unsure whether his urine or amniotic fluid.  She was hospitalized a few weeks ago for  labor and was given betamethasone x2 doses  Questions answered.  Speculum exam was performed by me: No loss of fluid from cervix noted with Valsalva and cough.  Small amount of cervical mucus present.  Cervix external os is fingertip dilated and internal os is closed on digital exam  Nitrazine and fern test were negative  O: /69   Wt 110 kg (242 lb)   LMP 2021   BMI 36.80 kg/m²   Patients' weight gain, fluid intake and exercise level discussed.  Vitals, fundal height , fetal position, and FHR reviewed on flowsheet    Lab:No results found for this or any previous visit (from the past 336 hour(s)).    A/P:  36 y.o.  at 35w6d presents for routine obstetric follow-up.  Patient is doing well currently  Size equals dates.  Previous  x2 and repeat  planned at 39 weeks.  Patient has no evidence of rupture of membranes at this point findings were reviewed.  NST was done today and is  Encounter Diagnoses   Name Primary?   • High-risk pregnancy supervision, third trimester    • Hx of  sectionx2    • Obesity complicating pregnancy in third trimester    •  labor without delivery          1.  Continue prenatal vitamins.  2.  Fetal kick counts daily reviewed and instructions were reviewed.  3.  Exercise at least 30 minutes daily.  4.  Drink at least 2L of water daily  5.  Pregnancy and labor precautions educated.  6.  Follow-up in 1 week  7.  GBS is negative from recent hospitalization

## 2021-10-03 ENCOUNTER — HOSPITAL ENCOUNTER (EMERGENCY)
Facility: MEDICAL CENTER | Age: 36
End: 2021-10-03
Attending: OBSTETRICS & GYNECOLOGY | Admitting: OBSTETRICS & GYNECOLOGY
Payer: COMMERCIAL

## 2021-10-03 VITALS
HEIGHT: 68 IN | HEART RATE: 93 BPM | SYSTOLIC BLOOD PRESSURE: 125 MMHG | WEIGHT: 242 LBS | BODY MASS INDEX: 36.68 KG/M2 | OXYGEN SATURATION: 95 % | DIASTOLIC BLOOD PRESSURE: 73 MMHG | TEMPERATURE: 97.5 F | RESPIRATION RATE: 17 BRPM

## 2021-10-03 LAB
APPEARANCE UR: ABNORMAL
COLOR UR AUTO: YELLOW
GLUCOSE UR QL STRIP.AUTO: NEGATIVE MG/DL
KETONES UR QL STRIP.AUTO: NEGATIVE MG/DL
LEUKOCYTE ESTERASE UR QL STRIP.AUTO: NEGATIVE
NITRITE UR QL STRIP.AUTO: NEGATIVE
PH UR STRIP.AUTO: 5 [PH] (ref 5–8)
PROT UR QL STRIP: NEGATIVE MG/DL
RBC UR QL AUTO: NEGATIVE
SP GR UR STRIP.AUTO: 1.01 (ref 1–1.03)

## 2021-10-03 PROCEDURE — 81002 URINALYSIS NONAUTO W/O SCOPE: CPT

## 2021-10-03 PROCEDURE — 99283 EMERGENCY DEPT VISIT LOW MDM: CPT

## 2021-10-03 PROCEDURE — 59025 FETAL NON-STRESS TEST: CPT

## 2021-10-03 PROCEDURE — 59025 FETAL NON-STRESS TEST: CPT | Mod: 26 | Performed by: PHYSICIAN ASSISTANT

## 2021-10-03 PROCEDURE — 302449 STATCHG TRIAGE ONLY (STATISTIC)

## 2021-10-03 ASSESSMENT — FIBROSIS 4 INDEX: FIB4 SCORE: 0.52

## 2021-10-03 NOTE — PROGRESS NOTES
0255: Pt arrives to triage with complaints of contractions. Pt reports feeling irregular contractions ranging from 3-8 minutes apart. Pt is  with EDC of 10/27 making her 36&4. Pt has history of C/S x2 with planned repeat c/s for this pregnancy. Pt reports good FM, denies LOF or VB.     SVE FT//High. Call made to Con Raphael with update.     Discharge order received.     0357: Discharge instructions dicussed with pt, all questions answered. Pt off unit, belongings accounted for.

## 2021-10-03 NOTE — ED PROVIDER NOTES
Emergency Obstetric Consultation     Date of Service  10/3/2021    Reason for Consultation  No chief complaint on file.      History of Presenting Illness  36 y.o. female who presented 10/3/2021 with UCs, denies LOF, VB. +FM. Pt is prior C/S x 2.     Review of Systems  ROS    Obstetric History    OB History    Para Term  AB Living   4 2 2   1 2   SAB TAB Ectopic Molar Multiple Live Births   1   0     2      # Outcome Date GA Lbr Tylor/2nd Weight Sex Delivery Anes PTL Lv   4 Current            3 Term 19 38w4d  2.22 kg (4 lb 14.3 oz) F CS-LTranv Spinal  MEAGHAN      Birth Comments: SGA   2 SAB            1 Term 07/04/10 40w0d  3.118 kg (6 lb 14 oz) M CS-LTranv   MEAGHAN      Complications: Cephalopelvic Disproportion       Gynecologic History  Patient's last menstrual period was 2021.    Medical History  Past Medical History:   Diagnosis Date   • Asthma     In childhood; hasn't used inhaler in 4 years per pt       Surgical History   has a past surgical history that includes gastric bypass laparoscopic; repair perforated ulcer; abdominoplasty; mammoplasty augmentation; and primary c section.    Family History  family history includes No Known Problems in her brother, father, mother, and sister.    Social History   reports that she has never smoked. She has never used smokeless tobacco. She reports previous alcohol use. She reports that she does not use drugs.    Medications  Medications Prior to Admission   Medication Sig Dispense Refill Last Dose   • acetaminophen (TYLENOL) 500 MG Tab Take 2 Tablets by mouth every 6 hours as needed. 30 Tablet 0    • docusate sodium 100 MG Cap Take 100 mg by mouth 2 times a day. 60 Capsule 0    • simethicone (MYLICON) 80 MG Chew Tab Chew 2 Tablets every 6 hours as needed (bloating/gas). 30 Tablet 3    • famotidine (PEPCID) 20 MG Tab Take 1 Tablet by mouth 2 times a day. 60 Tablet 0        Allergies  Allergies   Allergen Reactions   • Sulfa Drugs      Other  reaction(s): GI Bleeding   Surgery Date : 2014 JAN. 22nd    Dr Moreau -[facility]   Contraindicated after Steven en Y Gastric Bypass due to high risk ulceration.   If ASA used for SECONDARY prevention then please cover with protonix twice a day .         Physical Exam  Exam   Cervix: Ft/th/-3  NST: Cat 1 per my read, 150bpm, +accels, no decels, mod variability  TOCO: Occ UCs, intermittent    Laboratory               No results for input(s): NTPROBNP in the last 72 hours.         Imaging  None    Assessment & Plan  Assessment & Plan - IUP at 36w4d - not in labor, Cat 1 NST. PTL precautions reviewed. Daily FKC recommended. F/u at [facility] 1 wk.         MAIDA Ch.

## 2021-10-03 NOTE — DISCHARGE INSTRUCTIONS
General Instructions:  · If you think you are in labor, time contractions (lying on your left side) from the beginning of one contraction to the beginning of the next contraction for at least one hour.  · Increase fluid intake: you should consume 10-12 8 oz glasses of non-caffeinated fluid per day.  · Report any pressure or burning on urination to your physician.  · Monitor fetal movement: If you notice an absence or decrease in fetal movement, drink a large glass of water and rest on your side.  If there is no increase in movement, call your physician or go to the hospital for further evaluation.  · Report any sudden, sharp abdominal pain.  · Report any bleeding.  Spotting or pinkish discharge is normal after vaginal exam.  You may also spot after sexual intercourse.    Pre-term Labor (<37 weeks):  Call your physician or return to the hospital if:  · You have painless regular contractions more than 4 in one hour.  · Your water breaks (remember time and color).  · You have menstrual-like cramps, a low dull backache or pressure in your pelvis or back.  · Your baby does not move enough to complete the daily kick count (10 movements in 2 hours).  · Your baby moves much less often than on the days before or you have not felt your baby move all day.  · Please review the MEDICATION LIST section of your AFTER VISIT SUMMARY document.  · Take your medication as prescribed        Other Instructions:  Please carefully review your entire AFTER VISIT SUMMARY document for all discharge instructions.

## 2021-10-04 ENCOUNTER — ROUTINE PRENATAL (OUTPATIENT)
Dept: OBGYN | Facility: CLINIC | Age: 36
End: 2021-10-04
Payer: COMMERCIAL

## 2021-10-04 DIAGNOSIS — O47.00 PRETERM UTERINE CONTRACTIONS: ICD-10-CM

## 2021-10-04 PROCEDURE — 59025 FETAL NON-STRESS TEST: CPT | Performed by: OBSTETRICS & GYNECOLOGY

## 2021-10-06 ENCOUNTER — HOSPITAL ENCOUNTER (EMERGENCY)
Facility: MEDICAL CENTER | Age: 36
End: 2021-10-06
Attending: OBSTETRICS & GYNECOLOGY | Admitting: OBSTETRICS & GYNECOLOGY
Payer: COMMERCIAL

## 2021-10-06 ENCOUNTER — TELEPHONE (OUTPATIENT)
Dept: OBGYN | Facility: CLINIC | Age: 36
End: 2021-10-06

## 2021-10-06 VITALS
BODY MASS INDEX: 36.75 KG/M2 | TEMPERATURE: 97.6 F | RESPIRATION RATE: 16 BRPM | DIASTOLIC BLOOD PRESSURE: 88 MMHG | WEIGHT: 242.51 LBS | HEART RATE: 68 BPM | OXYGEN SATURATION: 98 % | SYSTOLIC BLOOD PRESSURE: 132 MMHG | HEIGHT: 68 IN

## 2021-10-06 DIAGNOSIS — L02.32 BOIL OF BUTTOCK: ICD-10-CM

## 2021-10-06 LAB — A1 MICROGLOB PLACENTAL VAG QL: NEGATIVE

## 2021-10-06 PROCEDURE — 59025 FETAL NON-STRESS TEST: CPT

## 2021-10-06 PROCEDURE — 99282 EMERGENCY DEPT VISIT SF MDM: CPT

## 2021-10-06 PROCEDURE — 99282 EMERGENCY DEPT VISIT SF MDM: CPT | Mod: 25 | Performed by: OBSTETRICS & GYNECOLOGY

## 2021-10-06 PROCEDURE — 59025 FETAL NON-STRESS TEST: CPT | Performed by: OBSTETRICS & GYNECOLOGY

## 2021-10-06 PROCEDURE — 84112 EVAL AMNIOTIC FLUID PROTEIN: CPT

## 2021-10-06 RX ORDER — CLINDAMYCIN HYDROCHLORIDE 300 MG/1
300 CAPSULE ORAL 3 TIMES DAILY
Qty: 30 CAPSULE | Refills: 0 | Status: ON HOLD | OUTPATIENT
Start: 2021-10-06 | End: 2021-10-15

## 2021-10-06 ASSESSMENT — PAIN SCALES - GENERAL: PAINLEVEL: 4

## 2021-10-06 ASSESSMENT — FIBROSIS 4 INDEX: FIB4 SCORE: 0.52

## 2021-10-06 NOTE — TELEPHONE ENCOUNTER
Pt called c/o some bleeding, LOF, pelvic/vaginal pressure and cramping since early this morning. Pt also reports she has felt baby move about 6 times in a 3 hr period. Adv pt to go to L&D for further evaluation, pt understood and will comply. Pt also stated she does not have appt scheduled for this wee, scheduled appt for this Friday 10/8/21. If pt needs another NST this coming week, she will call us and let us know.

## 2021-10-06 NOTE — DISCHARGE INSTRUCTIONS
General Instructions:  · If you think you are in labor, time contractions (lying on your left side) from the beginning of one contraction to the beginning of the next contraction for at least one hour.  · Increase fluid intake: you should consume 10-12 8 oz glasses of non-caffeinated fluid per day.  · Report any pressure or burning on urination to your physician.  · Monitor fetal movement: If you notice an absence or decrease in fetal movement, drink a large glass of water and rest on your side.  If there is no increase in movement, call your physician or go to the hospital for further evaluation.  · Report any sudden, sharp abdominal pain.  · Report any bleeding.  Spotting or pinkish discharge is normal after vaginal exam.  You may also spot after sexual intercourse.    Labor Instructions (37 - 39 weeks):  Call your physician or return to hospital if:  · You have regular contractions that get progressively closer, longer and stronger.  · Your water breaks (remember time and color).  · You have bleeding like a period.  · Your baby does not move enough to complete the daily kick counts (10 movements in 2 hours)  · Your baby moves much less often than on the days before or you have not felt your baby move all day.      Other Instructions:  Please carefully review your entire AFTER VISIT SUMMARY document for all discharge instructions.          Antibiotic Medicine, Adult    Antibiotic medicines treat infections caused by a type of germ called bacteria. They work by killing the bacteria that make you sick.  When do I need to take antibiotics?  You often need these medicines to treat bacterial infections, such as:  · A urinary tract infection (UTI).  · Strep throat.  · Meningitis. This affects the spinal cord and brain.  · A bad lung infection.  You may start the medicines while your doctor waits for tests to come back. When the tests come back, your doctor may change or stop your medicine.  When are antibiotics not  needed?  You do not need these medicines for most common illnesses, such as:  · A cold.  · The flu.  · A sore throat.  Antibiotics are not always needed for all infections caused by bacteria. Do not ask for these medicines, or take them, when they are not needed.  What are the risks of taking antibiotics?  Most antibiotics can cause an infection called Clostridioides difficile (C. diff). This causes watery poop (diarrhea). Let your doctor know right away if:  · You have watery poop while taking an antibiotic.  · You have watery poop after you stop taking an antibiotic. The illness can happen weeks after you stop the medicine.  You also have a risk of getting an infection in the future that antibiotics cannot treat (antibiotic-resistant infection). This type of infection can be dangerous.  What else should I know about taking antibiotics?    · You need to take the entire prescription.  ? Take the medicine for as long as told by your doctor.  ? Do not stop taking it even if you start to feel better.  · Try not to miss any doses. If you miss a dose, call your doctor.  · Birth control pills may not work. If you take birth control pills:  ? Keep on taking them.  ? Use a second form of birth control, such as a condom. Do this for as long as told by your doctor.  · Ask your doctor:  ? How long to wait in between doses.  ? If you should take the medicine with food.  ? If there is anything you should stay away from while taking the antibiotic, such as:  ? Food.  ? Drinks.  ? Medicines.  ? If there are any side effects you should watch for.  · Only take the medicines that your doctor told you to take. Do not take medicines that were given to someone else.  · Drink a large glass of water with the medicine.  · Ask the pharmacist for a tool to measure the medicine, such as:  ? A syringe.  ? A cup.  ? A spoon.  · Throw away any extra medicine.  Contact a doctor if:  · You get worse.  · You have new joint pain or muscle aches  after starting the medicine.  · You have side effects from the medicine, such as:  ? Stomach pain.  ? Watery poop.  ? Feeling sick to your stomach (nausea).  Get help right away if:  · You have signs of a very bad allergic reaction. If this happens, stop taking the medicine right away. Signs may include:  ? Hives. These are raised, itchy, red bumps on the skin.  ? Skin rash.  ? Trouble breathing.  ? Wheezing.  ? Swelling.  ? Feeling dizzy.  ? Throwing up (vomiting).  · Your pee (urine) is dark, or is the color of blood.  · Your skin turns yellow.  · You bruise easily.  · You bleed easily.  · You have very bad watery poop and cramps in your belly.  · You have a very bad headache.  Summary  · Antibiotics are often used to treat infections caused by bacteria.  · Only take these medicines when needed.  · Let your doctor know if you have watery poop while taking an antibiotic.  · You need to take the entire prescription.  This information is not intended to replace advice given to you by your health care provider. Make sure you discuss any questions you have with your health care provider.  Document Released: 09/26/2009 Document Revised: 01/24/2020 Document Reviewed: 12/20/2017  "[employer] Patient Education © 2020 [employer].    Clindamycin capsules  What is this medicine?  CLINDAMYCIN (KLIN da MARLENI sin) is a lincosamide antibiotic. It is used to treat certain kinds of bacterial infections. It will not work for colds, flu, or other viral infections.  This medicine may be used for other purposes; ask your health care provider or pharmacist if you have questions.  COMMON BRAND NAME(S): Cleocin  What should I tell my health care provider before I take this medicine?  They need to know if you have any of these conditions:  · kidney disease  · liver disease  · stomach problems like colitis  · an unusual or allergic reaction to clindamycin, lincomycin, or other medicines, foods, dyes like tartrazine or preservatives  · pregnant  or trying to get pregnant  · breast-feeding  How should I use this medicine?  Take this medicine by mouth with a full glass of water. Follow the directions on the prescription label. You can take this medicine with food or on an empty stomach. If the medicine upsets your stomach, take it with food. Take your medicine at regular intervals. Do not take your medicine more often than directed. Take all of your medicine as directed even if you think your are better. Do not skip doses or stop your medicine early.  Talk to your pediatrician regarding the use of this medicine in children. Special care may be needed.  Overdosage: If you think you have taken too much of this medicine contact a poison control center or emergency room at once.  NOTE: This medicine is only for you. Do not share this medicine with others.  What if I miss a dose?  If you miss a dose, take it as soon as you can. If it is almost time for your next dose, take only that dose. Do not take double or extra doses.  What may interact with this medicine?  · birth control pills  · erythromycin  · medicines that relax muscles for surgery  · rifampin  This list may not describe all possible interactions. Give your health care provider a list of all the medicines, herbs, non-prescription drugs, or dietary supplements you use. Also tell them if you smoke, drink alcohol, or use illegal drugs. Some items may interact with your medicine.  What should I watch for while using this medicine?  Tell your doctor or health care provider if your symptoms do not start to get better or if they get worse.  This medicine may cause serious skin reactions. They can happen weeks to months after starting the medicine. Contact your health care provider right away if you notice fevers or flu-like symptoms with a rash. The rash may be red or purple and then turn into blisters or peeling of the skin. Or, you might notice a red rash with swelling of the face, lips or lymph nodes in  your neck or under your arms.  Do not treat diarrhea with over the counter products. Contact your doctor if you have diarrhea that lasts more than 2 days or if it is severe and watery.  What side effects may I notice from receiving this medicine?  Side effects that you should report to your doctor or health care professional as soon as possible:  · allergic reactions like skin rash, itching or hives, swelling of the face, lips, or tongue  · dark urine  · pain on swallowing  · rash, fever, and swollen lymph nodes  · redness, blistering, peeling or loosening of the skin, including inside the mouth  · unusual bleeding or bruising  · unusually weak or tired  · yellowing of eyes or skin  Side effects that usually do not require medical attention (report to your doctor or health care professional if they continue or are bothersome):  · diarrhea  · itching in the rectal or genital area  · joint pain  · nausea, vomiting  · stomach pain  This list may not describe all possible side effects. Call your doctor for medical advice about side effects. You may report side effects to FDA at 9-076-PQX-6369.  Where should I keep my medicine?  Keep out of the reach of children.  Store at room temperature between 20 and 25 degrees C (68 and 77 degrees F). Throw away any unused medicine after the expiration date.  NOTE: This sheet is a summary. It may not cover all possible information. If you have questions about this medicine, talk to your doctor, pharmacist, or health care provider.  © 2020 [employer] (2020-03-19 12:02:12)        How to Take a Sitz Bath  A sitz bath is a warm water bath that may be used to care for your rectum, genital area, or the area between your rectum and genitals (perineum). For a sitz bath, the water only comes up to your hips and covers your buttocks. A sitz bath may done at home in a bathtub or with a portable sitz bath that fits over the toilet.  Your health care provider may recommend a sitz  bath to help:  · Relieve pain and discomfort after delivering a baby.  · Relieve pain and itching from hemorrhoids or anal fissures.  · Relieve pain after certain surgeries.  · Relax muscles that are sore or tight.  How to take a sitz bath  Take 3-4 sitz baths a day, or as many as told by your health care provider.  Bathtub sitz bath  To take a sitz bath in a bathtub:  1. Partially fill a bathtub with warm water. The water should be deep enough to cover your hips and buttocks when you are sitting in the tub.  2. If your health care provider told you to put medicine in the water, follow his or her instructions.  3. Sit in the water.  4. Open the tub drain a little, and leave it open during your bath.  5. Turn on the warm water again, enough to replace the water that is draining out. Keep the water running throughout your bath. This helps keep the water at the right level and the right temperature.  6. Soak in the water for 15-20 minutes, or as long as told by your health care provider.  7. When you are done, be careful when you stand up. You may feel dizzy.  8. After the sitz bath, pat yourself dry. Do not rub your skin to dry it.    Over-the-toilet sitz bath  To take a sitz bath with an over-the-toilet basin:  1. Follow the 's instructions.  2. Fill the basin with warm water.  3. If your health care provider told you to put medicine in the water, follow his or her instructions.  4. Sit on the seat. Make sure the water covers your buttocks and perineum.  5. Soak in the water for 15-20 minutes, or as long as told by your health care provider.  6. After the sitz bath, pat yourself dry. Do not rub your skin to dry it.  7. Clean and dry the basin between uses.  8. Discard the basin if it cracks, or according to the 's instructions.  Contact a health care provider if:  · Your symptoms get worse. Do not continue with sitz baths if your symptoms get worse.  · You have new symptoms. If this happens,  do not continue with sitz baths until you talk with your health care provider.  Summary  · A sitz bath is a warm water bath in which the water only comes up to your hips and covers your buttocks.  · A sitz bath may help relieve itching, relieve pain, and relax muscles that are sore or tight in the lower part of your body, including your genital area.  · Take 3-4 sitz baths a day, or as many as told by your health care provider. Soak in the water for 15-20 minutes.  · Do not continue with sitz baths if your symptoms get worse.  This information is not intended to replace advice given to you by your health care provider. Make sure you discuss any questions you have with your health care provider.  Document Released: 09/09/2005 Document Revised: 12/20/2018 Document Reviewed: 12/20/2018  [employer] Patient Education © 2020 [employer]      Sitz Bath  A sitz bath is a warm water bath taken in the sitting position. The water covers only the hips and butt (buttocks). It may be used for either healing or cleaning purposes. Sitz baths are also used to relieve pain, itching, or muscle tightening (spasms). The water may contain medicine. Moist heat will help you heal and relax.   HOME CARE   Take 3 to 4 sitz baths a day.  1. Fill the bathtub half-full with warm water.  2. Sit in the water and open the drain a little.  3. Turn on the warm water to keep the tub half-full. Keep the water running constantly.  4. Soak in the water for 15 to 20 minutes.  5. After the sitz bath, pat the affected area dry.  GET HELP RIGHT AWAY IF:  You get worse instead of better. Stop the sitz baths if you get worse.  MAKE SURE YOU:  · Understand these instructions.  · Will watch your condition.  · Will get help right away if you are not doing well or get worse.  Document Released: 01/25/2006 Document Revised: 09/11/2013 Document Reviewed: 04/16/2012  [employer]® Patient Information ©2014 [employer].

## 2021-10-06 NOTE — PROGRESS NOTES
1328-Pt here for decreased FM and bleeding. Pt reports this am had a large amount of red bleeding in toilet this am prior to coming in and a significant decrease in FM. Pt has not noticed any heavy bleeding since but pink when she uses restroom. Pt denies any leaking or worsening contraction. Pt states she was seen a few days ago for contractions and was 1 cm. Pt has history of c/s x2 and desires repeat c/s. Pt also reports getting boils after all cervical checks, thought its from latex but after discussion it could be gel. After last exam pt noticed boil on inner right butt cheek.   Pt placed on monitors (us and toco) SVE 2/thick/-2. Pt check with non latex gloves and water, no gel used. Boil assessed after exam. Fresh scab noted with some old blood around it. Pt states it could have also popped this am. POC dicussed with pt and fob. No questions at this time.   1345-Report given to Dr Grant. MD at bedside. Pt assessed. POC dicussed. Pt denies any needs or questions.   1500-Amnisure negative. Dr Grant updated. RX called in pharmacy.  1526 Discharge order received.   1530-Discharge instructions, labor precautions, RX and sitz bath reviewed with pt, wound cleaning and dressings explained and reviewed. Pt states understanding and denies any questions.   1536-Pt discharged home with

## 2021-10-06 NOTE — ED PROVIDER NOTES
S: Pt is a 36 y.o.  at 37w0d with Estimated Date of Delivery: 10/27/21 who presents to triage c/o decreased fetal movement as well as bloody fluid in the toilet. She has an ingrown hair on the buttocks and yesterday she soaked the area.  Patient also having contractions but has been having them since early third trimester.      O: LMP 2021          NST: Done and read by me         Indication:decreased  Fetal movement       FHR: 145       Variability: moderate       Acclerations: yes       Decelerations: no       Reactive: yes         [place]: uterine irritability       SVE: 1-2cm on nurse exam, thick, high. No bleeding in the vault.   Perineum: right buttocks has a follicular subcutaneous papule with surrounding erythema, non tender to touch, bleeding slightly. 2cm in size.          A/P  Patient Active Problem List    Diagnosis Date Noted   • High-risk pregnancy supervision, third trimester 2021   • Hx of  sectionx2 2021   • Obesity complicating pregnancy in third trimester 2021   • Premature labor 2021     Amniosure negative.     1.  IUP @ 37w0d  2.  reactive NST. Negative amniosure  3.  Labor precautions discussed with patient and her spouse at the bedside.   4.  Furuncle: clindamycin 300mg TID for 10 days. Advised patient she may get stomach upset or diarrhea from this medication. May take OTC Imodium PRN diarrhea.   5.  F/u in the office in 2 wks.      Evaluation and clinical decision making , including analysis of Fetal data and maternal lab work completed over a 1 hour period.       Konrad Grant DO

## 2021-10-08 ENCOUNTER — ROUTINE PRENATAL (OUTPATIENT)
Dept: OBGYN | Facility: CLINIC | Age: 36
End: 2021-10-08
Payer: COMMERCIAL

## 2021-10-08 VITALS — BODY MASS INDEX: 37.11 KG/M2 | SYSTOLIC BLOOD PRESSURE: 109 MMHG | WEIGHT: 244 LBS | DIASTOLIC BLOOD PRESSURE: 74 MMHG

## 2021-10-08 DIAGNOSIS — Z98.891 HX OF CESAREAN SECTION: ICD-10-CM

## 2021-10-08 DIAGNOSIS — O99.213 OBESITY COMPLICATING PREGNANCY IN THIRD TRIMESTER: ICD-10-CM

## 2021-10-08 PROCEDURE — 90040 PR PRENATAL FOLLOW UP: CPT | Performed by: OBSTETRICS & GYNECOLOGY

## 2021-10-08 ASSESSMENT — FIBROSIS 4 INDEX: FIB4 SCORE: 0.52

## 2021-10-13 ENCOUNTER — HOSPITAL ENCOUNTER (INPATIENT)
Facility: MEDICAL CENTER | Age: 36
LOS: 2 days | End: 2021-10-15
Attending: OBSTETRICS & GYNECOLOGY | Admitting: OBSTETRICS & GYNECOLOGY
Payer: COMMERCIAL

## 2021-10-13 ENCOUNTER — APPOINTMENT (OUTPATIENT)
Dept: ADMISSIONS | Facility: MEDICAL CENTER | Age: 36
End: 2021-10-13
Attending: OBSTETRICS & GYNECOLOGY
Payer: COMMERCIAL

## 2021-10-13 ENCOUNTER — ANESTHESIA (OUTPATIENT)
Dept: OBGYN | Facility: MEDICAL CENTER | Age: 36
End: 2021-10-13
Payer: COMMERCIAL

## 2021-10-13 ENCOUNTER — ANESTHESIA EVENT (OUTPATIENT)
Dept: OBGYN | Facility: MEDICAL CENTER | Age: 36
End: 2021-10-13
Payer: COMMERCIAL

## 2021-10-13 ENCOUNTER — TELEPHONE (OUTPATIENT)
Dept: OBGYN | Facility: CLINIC | Age: 36
End: 2021-10-13

## 2021-10-13 LAB
A1 MICROGLOB PLACENTAL VAG QL: NEGATIVE
BASOPHILS # BLD AUTO: 0.2 % (ref 0–1.8)
BASOPHILS # BLD: 0.02 K/UL (ref 0–0.12)
CRYSTALS AMN MICRO: NORMAL
EOSINOPHIL # BLD AUTO: 0.01 K/UL (ref 0–0.51)
EOSINOPHIL NFR BLD: 0.1 % (ref 0–6.9)
ERYTHROCYTE [DISTWIDTH] IN BLOOD BY AUTOMATED COUNT: 43.8 FL (ref 35.9–50)
ERYTHROCYTE [DISTWIDTH] IN BLOOD BY AUTOMATED COUNT: 44.3 FL (ref 35.9–50)
HCT VFR BLD AUTO: 31.3 % (ref 37–47)
HCT VFR BLD AUTO: 37.7 % (ref 37–47)
HGB BLD-MCNC: 10.3 G/DL (ref 12–16)
HGB BLD-MCNC: 12.5 G/DL (ref 12–16)
HOLDING TUBE BB 8507: NORMAL
IMM GRANULOCYTES # BLD AUTO: 0.04 K/UL (ref 0–0.11)
IMM GRANULOCYTES NFR BLD AUTO: 0.4 % (ref 0–0.9)
LYMPHOCYTES # BLD AUTO: 1.61 K/UL (ref 1–4.8)
LYMPHOCYTES NFR BLD: 17.1 % (ref 22–41)
MCH RBC QN AUTO: 28 PG (ref 27–33)
MCH RBC QN AUTO: 28.2 PG (ref 27–33)
MCHC RBC AUTO-ENTMCNC: 32.9 G/DL (ref 33.6–35)
MCHC RBC AUTO-ENTMCNC: 33.2 G/DL (ref 33.6–35)
MCV RBC AUTO: 84.3 FL (ref 81.4–97.8)
MCV RBC AUTO: 85.8 FL (ref 81.4–97.8)
MONOCYTES # BLD AUTO: 0.29 K/UL (ref 0–0.85)
MONOCYTES NFR BLD AUTO: 3.1 % (ref 0–13.4)
NEUTROPHILS # BLD AUTO: 7.45 K/UL (ref 2–7.15)
NEUTROPHILS NFR BLD: 79.1 % (ref 44–72)
NRBC # BLD AUTO: 0 K/UL
NRBC BLD-RTO: 0 /100 WBC
PLATELET # BLD AUTO: 265 K/UL (ref 164–446)
PLATELET # BLD AUTO: 338 K/UL (ref 164–446)
PMV BLD AUTO: 10.8 FL (ref 9–12.9)
PMV BLD AUTO: 10.9 FL (ref 9–12.9)
RBC # BLD AUTO: 3.65 M/UL (ref 4.2–5.4)
RBC # BLD AUTO: 4.47 M/UL (ref 4.2–5.4)
SARS-COV+SARS-COV-2 AG RESP QL IA.RAPID: NOTDETECTED
SPECIMEN SOURCE: NORMAL
WBC # BLD AUTO: 14.8 K/UL (ref 4.8–10.8)
WBC # BLD AUTO: 9.4 K/UL (ref 4.8–10.8)

## 2021-10-13 PROCEDURE — 700111 HCHG RX REV CODE 636 W/ 250 OVERRIDE (IP): Performed by: ANESTHESIOLOGY

## 2021-10-13 PROCEDURE — 59510 CESAREAN DELIVERY: CPT | Performed by: OBSTETRICS & GYNECOLOGY

## 2021-10-13 PROCEDURE — 302128 INFUSION PUMP: Performed by: OBSTETRICS & GYNECOLOGY

## 2021-10-13 PROCEDURE — 89060 EXAM SYNOVIAL FLUID CRYSTALS: CPT

## 2021-10-13 PROCEDURE — 700105 HCHG RX REV CODE 258: Performed by: ANESTHESIOLOGY

## 2021-10-13 PROCEDURE — 770002 HCHG ROOM/CARE - OB PRIVATE (112)

## 2021-10-13 PROCEDURE — 160048 HCHG OR STATISTICAL LEVEL 1-5: Performed by: OBSTETRICS & GYNECOLOGY

## 2021-10-13 PROCEDURE — 96374 THER/PROPH/DIAG INJ IV PUSH: CPT

## 2021-10-13 PROCEDURE — 36415 COLL VENOUS BLD VENIPUNCTURE: CPT

## 2021-10-13 PROCEDURE — 700102 HCHG RX REV CODE 250 W/ 637 OVERRIDE(OP): Performed by: ANESTHESIOLOGY

## 2021-10-13 PROCEDURE — 85025 COMPLETE CBC W/AUTO DIFF WBC: CPT

## 2021-10-13 PROCEDURE — 700111 HCHG RX REV CODE 636 W/ 250 OVERRIDE (IP)

## 2021-10-13 PROCEDURE — A9270 NON-COVERED ITEM OR SERVICE: HCPCS | Performed by: ANESTHESIOLOGY

## 2021-10-13 PROCEDURE — 87426 SARSCOV CORONAVIRUS AG IA: CPT

## 2021-10-13 PROCEDURE — 700102 HCHG RX REV CODE 250 W/ 637 OVERRIDE(OP): Performed by: OBSTETRICS & GYNECOLOGY

## 2021-10-13 PROCEDURE — 85027 COMPLETE CBC AUTOMATED: CPT

## 2021-10-13 PROCEDURE — 700105 HCHG RX REV CODE 258

## 2021-10-13 PROCEDURE — 302449 STATCHG TRIAGE ONLY (STATISTIC)

## 2021-10-13 PROCEDURE — 160002 HCHG RECOVERY MINUTES (STAT): Performed by: OBSTETRICS & GYNECOLOGY

## 2021-10-13 PROCEDURE — 160029 HCHG SURGERY MINUTES - 1ST 30 MINS LEVEL 4: Performed by: OBSTETRICS & GYNECOLOGY

## 2021-10-13 PROCEDURE — A9270 NON-COVERED ITEM OR SERVICE: HCPCS | Performed by: OBSTETRICS & GYNECOLOGY

## 2021-10-13 PROCEDURE — 59514 CESAREAN DELIVERY ONLY: CPT | Mod: AS | Performed by: NURSE PRACTITIONER

## 2021-10-13 PROCEDURE — 700101 HCHG RX REV CODE 250: Performed by: ANESTHESIOLOGY

## 2021-10-13 PROCEDURE — 160035 HCHG PACU - 1ST 60 MINS PHASE I: Performed by: OBSTETRICS & GYNECOLOGY

## 2021-10-13 PROCEDURE — 160041 HCHG SURGERY MINUTES - EA ADDL 1 MIN LEVEL 4: Performed by: OBSTETRICS & GYNECOLOGY

## 2021-10-13 PROCEDURE — 84112 EVAL AMNIOTIC FLUID PROTEIN: CPT

## 2021-10-13 PROCEDURE — 160009 HCHG ANES TIME/MIN: Performed by: OBSTETRICS & GYNECOLOGY

## 2021-10-13 RX ORDER — SODIUM CHLORIDE, SODIUM LACTATE, POTASSIUM CHLORIDE, CALCIUM CHLORIDE 600; 310; 30; 20 MG/100ML; MG/100ML; MG/100ML; MG/100ML
INJECTION, SOLUTION INTRAVENOUS PRN
Status: DISCONTINUED | OUTPATIENT
Start: 2021-10-13 | End: 2021-10-15 | Stop reason: HOSPADM

## 2021-10-13 RX ORDER — HYDROMORPHONE HYDROCHLORIDE 1 MG/ML
0.2 INJECTION, SOLUTION INTRAMUSCULAR; INTRAVENOUS; SUBCUTANEOUS
Status: ACTIVE | OUTPATIENT
Start: 2021-10-13 | End: 2021-10-14

## 2021-10-13 RX ORDER — DEXAMETHASONE SODIUM PHOSPHATE 4 MG/ML
INJECTION, SOLUTION INTRA-ARTICULAR; INTRALESIONAL; INTRAMUSCULAR; INTRAVENOUS; SOFT TISSUE PRN
Status: DISCONTINUED | OUTPATIENT
Start: 2021-10-13 | End: 2021-10-13 | Stop reason: SURG

## 2021-10-13 RX ORDER — SODIUM CHLORIDE, SODIUM LACTATE, POTASSIUM CHLORIDE, CALCIUM CHLORIDE 600; 310; 30; 20 MG/100ML; MG/100ML; MG/100ML; MG/100ML
INJECTION, SOLUTION INTRAVENOUS CONTINUOUS
Status: DISCONTINUED | OUTPATIENT
Start: 2021-10-13 | End: 2021-10-13

## 2021-10-13 RX ORDER — SIMETHICONE 80 MG
80 TABLET,CHEWABLE ORAL 4 TIMES DAILY PRN
Status: DISCONTINUED | OUTPATIENT
Start: 2021-10-13 | End: 2021-10-15 | Stop reason: HOSPADM

## 2021-10-13 RX ORDER — METHYLERGONOVINE MALEATE 0.2 MG/ML
0.2 INJECTION INTRAVENOUS
Status: DISCONTINUED | OUTPATIENT
Start: 2021-10-13 | End: 2021-10-15 | Stop reason: HOSPADM

## 2021-10-13 RX ORDER — DOCUSATE SODIUM 100 MG/1
100 CAPSULE, LIQUID FILLED ORAL 2 TIMES DAILY PRN
Status: DISCONTINUED | OUTPATIENT
Start: 2021-10-13 | End: 2021-10-15 | Stop reason: HOSPADM

## 2021-10-13 RX ORDER — MISOPROSTOL 200 UG/1
800 TABLET ORAL
Status: DISCONTINUED | OUTPATIENT
Start: 2021-10-13 | End: 2021-10-15 | Stop reason: HOSPADM

## 2021-10-13 RX ORDER — HYDROMORPHONE HYDROCHLORIDE 1 MG/ML
0.4 INJECTION, SOLUTION INTRAMUSCULAR; INTRAVENOUS; SUBCUTANEOUS
Status: ACTIVE | OUTPATIENT
Start: 2021-10-13 | End: 2021-10-14

## 2021-10-13 RX ORDER — KETOROLAC TROMETHAMINE 30 MG/ML
30 INJECTION, SOLUTION INTRAMUSCULAR; INTRAVENOUS EVERY 6 HOURS
Status: ACTIVE | OUTPATIENT
Start: 2021-10-13 | End: 2021-10-14

## 2021-10-13 RX ORDER — DIPHENHYDRAMINE HYDROCHLORIDE 50 MG/ML
12.5 INJECTION INTRAMUSCULAR; INTRAVENOUS EVERY 6 HOURS PRN
Status: ACTIVE | OUTPATIENT
Start: 2021-10-13 | End: 2021-10-14

## 2021-10-13 RX ORDER — CEFAZOLIN SODIUM 1 G/3ML
INJECTION, POWDER, FOR SOLUTION INTRAMUSCULAR; INTRAVENOUS PRN
Status: DISCONTINUED | OUTPATIENT
Start: 2021-10-13 | End: 2021-10-13 | Stop reason: SURG

## 2021-10-13 RX ORDER — HALOPERIDOL 5 MG/ML
1 INJECTION INTRAMUSCULAR
Status: DISCONTINUED | OUTPATIENT
Start: 2021-10-13 | End: 2021-10-13 | Stop reason: HOSPADM

## 2021-10-13 RX ORDER — OXYCODONE HCL 5 MG/5 ML
5 SOLUTION, ORAL ORAL
Status: COMPLETED | OUTPATIENT
Start: 2021-10-13 | End: 2021-10-13

## 2021-10-13 RX ORDER — CITRIC ACID/SODIUM CITRATE 334-500MG
30 SOLUTION, ORAL ORAL ONCE
Status: COMPLETED | OUTPATIENT
Start: 2021-10-13 | End: 2021-10-13

## 2021-10-13 RX ORDER — ACETAMINOPHEN 500 MG
500 TABLET ORAL EVERY 6 HOURS
Status: CANCELLED | OUTPATIENT
Start: 2021-10-13

## 2021-10-13 RX ORDER — DIPHENHYDRAMINE HYDROCHLORIDE 50 MG/ML
12.5 INJECTION INTRAMUSCULAR; INTRAVENOUS
Status: DISCONTINUED | OUTPATIENT
Start: 2021-10-13 | End: 2021-10-13 | Stop reason: HOSPADM

## 2021-10-13 RX ORDER — OXYCODONE HCL 5 MG/5 ML
10 SOLUTION, ORAL ORAL
Status: COMPLETED | OUTPATIENT
Start: 2021-10-13 | End: 2021-10-13

## 2021-10-13 RX ORDER — OXYCODONE HYDROCHLORIDE 10 MG/1
10 TABLET ORAL EVERY 4 HOURS PRN
Status: ACTIVE | OUTPATIENT
Start: 2021-10-13 | End: 2021-10-14

## 2021-10-13 RX ORDER — SODIUM CHLORIDE, SODIUM GLUCONATE, SODIUM ACETATE, POTASSIUM CHLORIDE AND MAGNESIUM CHLORIDE 526; 502; 368; 37; 30 MG/100ML; MG/100ML; MG/100ML; MG/100ML; MG/100ML
INJECTION, SOLUTION INTRAVENOUS
Status: DISCONTINUED | OUTPATIENT
Start: 2021-10-13 | End: 2021-10-13 | Stop reason: SURG

## 2021-10-13 RX ORDER — ONDANSETRON 2 MG/ML
4 INJECTION INTRAMUSCULAR; INTRAVENOUS
Status: DISCONTINUED | OUTPATIENT
Start: 2021-10-13 | End: 2021-10-13 | Stop reason: HOSPADM

## 2021-10-13 RX ORDER — ONDANSETRON 2 MG/ML
INJECTION INTRAMUSCULAR; INTRAVENOUS PRN
Status: DISCONTINUED | OUTPATIENT
Start: 2021-10-13 | End: 2021-10-13 | Stop reason: SURG

## 2021-10-13 RX ORDER — CLINDAMYCIN HYDROCHLORIDE 150 MG/1
300 CAPSULE ORAL 3 TIMES DAILY
Status: DISCONTINUED | OUTPATIENT
Start: 2021-10-13 | End: 2021-10-15 | Stop reason: HOSPADM

## 2021-10-13 RX ORDER — MORPHINE SULFATE 0.5 MG/ML
INJECTION, SOLUTION EPIDURAL; INTRATHECAL; INTRAVENOUS
Status: COMPLETED | OUTPATIENT
Start: 2021-10-13 | End: 2021-10-13

## 2021-10-13 RX ORDER — BUPIVACAINE HYDROCHLORIDE 7.5 MG/ML
INJECTION, SOLUTION INTRASPINAL
Status: COMPLETED | OUTPATIENT
Start: 2021-10-13 | End: 2021-10-13

## 2021-10-13 RX ORDER — METOCLOPRAMIDE HYDROCHLORIDE 5 MG/ML
10 INJECTION INTRAMUSCULAR; INTRAVENOUS ONCE
Status: COMPLETED | OUTPATIENT
Start: 2021-10-13 | End: 2021-10-13

## 2021-10-13 RX ORDER — ACETAMINOPHEN 500 MG
1000 TABLET ORAL EVERY 6 HOURS
Status: COMPLETED | OUTPATIENT
Start: 2021-10-13 | End: 2021-10-14

## 2021-10-13 RX ORDER — OXYCODONE HYDROCHLORIDE 5 MG/1
5 TABLET ORAL EVERY 4 HOURS PRN
Status: DISPENSED | OUTPATIENT
Start: 2021-10-13 | End: 2021-10-14

## 2021-10-13 RX ORDER — SODIUM CHLORIDE, SODIUM GLUCONATE, SODIUM ACETATE, POTASSIUM CHLORIDE AND MAGNESIUM CHLORIDE 526; 502; 368; 37; 30 MG/100ML; MG/100ML; MG/100ML; MG/100ML; MG/100ML
1500 INJECTION, SOLUTION INTRAVENOUS ONCE
Status: COMPLETED | OUTPATIENT
Start: 2021-10-13 | End: 2021-10-13

## 2021-10-13 RX ORDER — DIPHENHYDRAMINE HYDROCHLORIDE 50 MG/ML
25 INJECTION INTRAMUSCULAR; INTRAVENOUS EVERY 6 HOURS PRN
Status: ACTIVE | OUTPATIENT
Start: 2021-10-13 | End: 2021-10-14

## 2021-10-13 RX ADMIN — OXYTOCIN 125 ML/HR: 10 INJECTION, SOLUTION INTRAMUSCULAR; INTRAVENOUS at 12:26

## 2021-10-13 RX ADMIN — FAMOTIDINE 20 MG: 10 INJECTION INTRAVENOUS at 10:04

## 2021-10-13 RX ADMIN — CLINDAMYCIN HYDROCHLORIDE 300 MG: 150 CAPSULE ORAL at 18:00

## 2021-10-13 RX ADMIN — CEFAZOLIN 2 G: 330 INJECTION, POWDER, FOR SOLUTION INTRAMUSCULAR; INTRAVENOUS at 10:44

## 2021-10-13 RX ADMIN — SODIUM CHLORIDE, SODIUM GLUCONATE, SODIUM ACETATE, POTASSIUM CHLORIDE AND MAGNESIUM CHLORIDE: 526; 502; 368; 37; 30 INJECTION, SOLUTION INTRAVENOUS at 10:33

## 2021-10-13 RX ADMIN — ACETAMINOPHEN 1000 MG: 500 TABLET ORAL at 21:23

## 2021-10-13 RX ADMIN — FENTANYL CITRATE 12.5 MCG: 50 INJECTION, SOLUTION INTRAMUSCULAR; INTRAVENOUS at 10:35

## 2021-10-13 RX ADMIN — BUPIVACAINE HYDROCHLORIDE IN DEXTROSE 1.5 ML: 7.5 INJECTION, SOLUTION SUBARACHNOID at 10:35

## 2021-10-13 RX ADMIN — SODIUM CHLORIDE, SODIUM GLUCONATE, SODIUM ACETATE, POTASSIUM CHLORIDE AND MAGNESIUM CHLORIDE 1500 ML: 526; 502; 368; 37; 30 INJECTION, SOLUTION INTRAVENOUS at 08:45

## 2021-10-13 RX ADMIN — OXYCODONE HYDROCHLORIDE 10 MG: 5 SOLUTION ORAL at 12:52

## 2021-10-13 RX ADMIN — MORPHINE SULFATE 100 MCG: 0.5 INJECTION, SOLUTION EPIDURAL; INTRATHECAL; INTRAVENOUS at 10:35

## 2021-10-13 RX ADMIN — ACETAMINOPHEN 1000 MG: 500 TABLET ORAL at 15:47

## 2021-10-13 RX ADMIN — DEXAMETHASONE SODIUM PHOSPHATE 8 MG: 4 INJECTION, SOLUTION INTRA-ARTICULAR; INTRALESIONAL; INTRAMUSCULAR; INTRAVENOUS; SOFT TISSUE at 10:54

## 2021-10-13 RX ADMIN — ONDANSETRON 4 MG: 2 INJECTION INTRAMUSCULAR; INTRAVENOUS at 10:54

## 2021-10-13 RX ADMIN — SODIUM CITRATE AND CITRIC ACID MONOHYDRATE 30 ML: 500; 334 SOLUTION ORAL at 10:04

## 2021-10-13 RX ADMIN — OXYTOCIN 1000 ML: 10 INJECTION, SOLUTION INTRAMUSCULAR; INTRAVENOUS at 11:13

## 2021-10-13 RX ADMIN — OXYCODONE 5 MG: 5 TABLET ORAL at 21:22

## 2021-10-13 RX ADMIN — METOCLOPRAMIDE 10 MG: 5 INJECTION, SOLUTION INTRAMUSCULAR; INTRAVENOUS at 10:05

## 2021-10-13 ASSESSMENT — LIFESTYLE VARIABLES: EVER_SMOKED: YES

## 2021-10-13 ASSESSMENT — PATIENT HEALTH QUESTIONNAIRE - PHQ9
1. LITTLE INTEREST OR PLEASURE IN DOING THINGS: NOT AT ALL
SUM OF ALL RESPONSES TO PHQ9 QUESTIONS 1 AND 2: 0
2. FEELING DOWN, DEPRESSED, IRRITABLE, OR HOPELESS: NOT AT ALL

## 2021-10-13 ASSESSMENT — PAIN DESCRIPTION - PAIN TYPE: TYPE: ACUTE PAIN;SURGICAL PAIN

## 2021-10-13 ASSESSMENT — PAIN SCALES - GENERAL
PAIN_LEVEL: 0
PAINLEVEL: 5 - MODERATE PAIN

## 2021-10-13 ASSESSMENT — FIBROSIS 4 INDEX: FIB4 SCORE: 0.52

## 2021-10-13 NOTE — ANESTHESIA TIME REPORT
Anesthesia Start and Stop Event Times     Date Time Event    10/13/2021 1025 Ready for Procedure     1033 Anesthesia Start     1159 Anesthesia Stop        Responsible Staff  10/13/21    Name Role Begin End    Elvis Valle M.D. Anesth 1033 1159        Preop Diagnosis (Free Text):  Pre-op Diagnosis     Repeat ceasarean section         Preop Diagnosis (Codes):  Diagnosis Information     Diagnosis Code(s): Status post repeat low transverse  section [Z98.891]        Premium Reason  Non-Premium    Comments:

## 2021-10-13 NOTE — PROGRESS NOTES
Pt is a ; PRASANTH of 10/[birth year]; making her 38w. Hx c/s x2. Pt arrived reporting UCs q3min and unsure of SROM after taking a shower this morning. Denies VB and reports +FM. Pt reports abdomen tender to touch upon assessment, soft at rest. RIZWAN Clark CNM notified. Fern collected and sent. SVE at /-2. Will update with fern results.     Fern Negative. RIZWAN Clrak and Dr Ames updated. Orders for recheck and Amnisure. Amnisure collected and sent. SVE at -3/50/-2.    Dr Garcia at bedside to assess pt. C Section called.     Report given to Digna and Soha ZHANG.

## 2021-10-13 NOTE — PROGRESS NOTES
Pt arrived via hospitals with belongings to room S351. Patient transferred from hospitals to hospital bed without difficulties and tolerated well. Report received from Digna Cherry L&D RN. Patient assessment complete and WDL. Fundus firm and lochia , VSS. IV infusing 125mL of pitocin in L AC. Sethi catheter in place, draining to gravity yellow urine. Bilateral SCDs in place. Patient states pain is tolerable, see MAR/flowsheet. Patient denies any dizziness/lightheadedness or calf pain/tenderness. Patient also denies any chest pain, difficulty breathing or SOB, respiratory symptoms, or any recent ill contacts. Patient oriented to unit, routine postpartum  cares, room, call light, emergency light, infant safety and security. Kindly reminded patient and support person to wear face masks when staff is present in room; also visitors need to wear mask when present in patient's room. Visiting hours discussed with patient and SO. Plan of care discussed with patient for the day including infant feeding every 3-4 hours or on demand and pain management. Pain medication plan discussed with patient; patient educated on scheduled pain medication for the first 24 hours following delivery and PRN medication available. All questions/concerns addressed at this time. Call light within reach and patient encouraged to call with needs. Will continue with routine postpartum cares.

## 2021-10-13 NOTE — H&P
Obstetrics Admission History and Physical    CC: contractions    History of Present Illness  Patient is a 36 y.o.  at 38w0d who presents for contractions.  She reports being very uncomfortable with contractions every 1-2 min.  She also reports leakage of clear fluid. She was 2cm on admission to triage and ROM was excluded by fern and Amnisure but cervix changed slightly to 2-3cm.  Given pt presentation and frequent contractions in history of 2 prior sections, she appears to be laboring.  She states the baby is moving well.      Pregnancy dated by: LMP c/w 7wk US performed by [facility]    Pregnancy and history complicated by h/o gastric bypass surgery and tummy tuck.  Pt states she cannot take NSAIDs due to gastric surgery.  She denies any recollection of problems with scar tissue or bleeding with prior sections.    Pregnancy complications/antepartum admissions:   Patient Active Problem List   Diagnosis   • High-risk pregnancy supervision, third trimester   • Hx of  sectionx2   • Obesity complicating pregnancy in third trimester   • Premature labor   • Boil of buttock          OB History    Para Term  AB Living   4 2 2   1 2   SAB TAB Ectopic Molar Multiple Live Births   1   0     2      # Outcome Date GA Lbr Tylor/2nd Weight Sex Delivery Anes PTL Lv   4 Current            3 Term 19 38w4d  2.22 kg (4 lb 14.3 oz) F CS-LTranv Spinal  MEAGHAN      Birth Comments: SGA   2 SAB            1 Term 07/04/10 40w0d  3.118 kg (6 lb 14 oz) M CS-LTranv   MEAGHAN      Complications: Cephalopelvic Disproportion       Past Medical History:   Diagnosis Date   • Asthma     In childhood; hasn't used inhaler in 4 years per pt       No current facility-administered medications on file prior to encounter.     Current Outpatient Medications on File Prior to Encounter   Medication Sig Dispense Refill   • clindamycin (CLEOCIN) 300 MG Cap Take 1 Capsule by mouth 3 times a day for 10 days. 30 Capsule 0   •  acetaminophen (TYLENOL) 500 MG Tab Take 2 Tablets by mouth every 6 hours as needed. 30 Tablet 0   • docusate sodium 100 MG Cap Take 100 mg by mouth 2 times a day. 60 Capsule 0   • simethicone (MYLICON) 80 MG Chew Tab Chew 2 Tablets every 6 hours as needed (bloating/gas). 30 Tablet 3   • famotidine (PEPCID) 20 MG Tab Take 1 Tablet by mouth 2 times a day. 60 Tablet 0       Allergies   Allergen Reactions   • Sulfa Drugs      Other reaction(s): GI Bleeding   Surgery Date : 2014 JAN. 22nd    Dr Moreau -DIP[facility]   Contraindicated after Setven en Y Gastric Bypass due to high risk ulceration.   If ASA used for SECONDARY prevention then please cover with protonix twice a day .     • Ibuprofen Unspecified     Pt hx of gastric bypass surgery   • K-Y Jelly [Lubricating Jelly] Rash     PT reports having a reaction after last few visits post cervical exams. Unsure if latex allergy or allergy to gel.   • Latex Rash     Pt reports having a reaction after last few visits after cervical exams. Unsure if latex allergy or allergy to gel         Family History   Problem Relation Age of Onset   • No Known Problems Mother    • No Known Problems Father    • No Known Problems Sister    • No Known Problems Brother        Past Surgical History:   Procedure Laterality Date   • ABDOMINOPLASTY     • GASTRIC BYPASS LAPAROSCOPIC     • MAMMOPLASTY AUGMENTATION     • PRIMARY C SECTION      c/s x2   • REPAIR PERFORATED ULCER         Social History  Social History     Socioeconomic History   • Marital status:      Spouse name: Not on file   • Number of children: Not on file   • Years of education: Not on file   • Highest education level: Not on file   Occupational History   • Not on file   Tobacco Use   • Smoking status: Never Smoker   • Smokeless tobacco: Never Used   Vaping Use   • Vaping Use: Never used   Substance and Sexual Activity   • Alcohol use: Not Currently   • Drug use: Never   • Sexual activity: Yes     Partners: Male   Other  Topics Concern   • Not on file   Social History Narrative   • Not on file     Social Determinants of Health     Financial Resource Strain:    • Difficulty of Paying Living Expenses:    Food Insecurity:    • Worried About Running Out of Food in the Last Year:    • Ran Out of Food in the Last Year:    Transportation Needs:    • Lack of Transportation (Medical):    • Lack of Transportation (Non-Medical):    Physical Activity:    • Days of Exercise per Week:    • Minutes of Exercise per Session:    Stress:    • Feeling of Stress :    Social Connections:    • Frequency of Communication with Friends and Family:    • Frequency of Social Gatherings with Friends and Family:    • Attends Gnosticist Services:    • Active Member of Clubs or Organizations:    • Attends Club or Organization Meetings:    • Marital Status:    Intimate Partner Violence:    • Fear of Current or Ex-Partner:    • Emotionally Abused:    • Physically Abused:    • Sexually Abused:        Review of Systems   General: Fever: Negative  HEENT: Sore Throat: Negative  CV: Chest Pain: Negative  Repiratory: Shortness of Breath: Negative  GI: Abdominal pain: Negative  : Dysuria: Negative    Prenatal Labs:  CF/SMA neg  A1c 5.0  A+/Ab -  RI  HIV/HBsAg neg/T pall neg  NIPT neg  B12 wnl, TSH wnl, B1 wnl    Physical Examination  Vitals:    10/13/21 0630   BP: 132/85   Pulse: (!) 108   Resp: 16   Temp: 36.1 °C (96.9 °F)   SpO2: 98%     Appearance/Psychiatric: She does not appear anxious.  Constitutional: The patient is well nourished.  Neck: Neck appears symmetric.  Cardiovascular: No peripheral edema.  Respiratory: Respirations unlabored  Gastrointestinal: Soft, non-tender, gravid.  Extremeties: Legs are symmetric and without tenderness.   Skin: No rash observed.    Pelvic: SVE: 2-3cm    NST: 130, Cat I    Labs:  Group-B Beta Strep Status:  negative          Invalid input(s):  ABSCRN,  CBC PLTDF,  HEMOGLOBIN,  PLATELETCT,  HBA1C,  [id number], HIV,  [facility]      Assessment/Plan:   36 y.o.  at 38w0d in early labor with history of 2 prior sections   - will proceed with repeat    - The risks, benefits and alternatives of  were discussed.  The risks include DVT/pulmonary embolism, pelvic scarring, pelvic pain, infection, bleeding, scarring, injury to bowel, bladder, ureters or blood vessels, anesthesia complications, injury to fetus and death. Future consideration for repeat  section vs trial of labor after  were also mentioned.  I also discussed with the patient the risk of wound infection and wound breakdown. We discussed that these risks are greater in people that have diabetes or obesity. I also discussed the risk of emergency blood transfusion during procedure as well as emergency hysterectomy during procedure. Patient had the opportunity to ask questions regarding procedures. All questions answered to the patient's satisfaction. Pt agrees to proceed with surgery.     - no NSAIDs. Plan scheduled tylenol for postop pain with cayla for breakthrough.                Giovana Garcia D.O.

## 2021-10-13 NOTE — ANESTHESIA POSTPROCEDURE EVALUATION
Patient: Aleyda Verma    Procedure Summary     Date: 10/13/21 Room / Location: [facility] OR 01 / SURGERY LABOR AND DELIVERY    Anesthesia Start: 1033 Anesthesia Stop: 1159    Procedure:  SECTION, REPEAT, WITH SALPINGECTOMY (Bilateral ) Diagnosis:       Status post repeat low transverse  section      (Repeat ceasarean section )    Surgeons: Giovana Garcia D.O. Responsible Provider: Elvis Valle M.D.    Anesthesia Type: spinal ASA Status: 2          Final Anesthesia Type: spinal  Last vitals  BP   Blood Pressure: 132/85    Temp   36.1 °C (96.9 °F)    Pulse   (!) 108   Resp   16    SpO2   98 %      Anesthesia Post Evaluation    Patient location during evaluation: PACU  Patient participation: complete - patient participated  Level of consciousness: awake and alert  Pain score: 0    Airway patency: patent  Anesthetic complications: no  Cardiovascular status: adequate  Respiratory status: acceptable  Hydration status: acceptable    PONV: none          No complications documented.     Nurse Pain Score: 7 (NPRS)

## 2021-10-13 NOTE — TELEPHONE ENCOUNTER
Luisana from [facility], called requesting lab results from 04/2021 to be sent to [facility] . Lab Results were faxed too [phone].

## 2021-10-13 NOTE — ANESTHESIA PREPROCEDURE EVALUATION
Relevant Problems   OB   (positive) Hx of  sectionx2   (positive) Premature labor       Physical Exam    Airway   Mallampati: II  TM distance: >3 FB  Neck ROM: full       Cardiovascular - normal exam  Rhythm: regular  Rate: normal     Dental - normal exam           Pulmonary   Breath sounds clear to auscultation     Abdominal - normal exam     Neurological - normal exam                 Anesthesia Plan    ASA 2       Plan - spinal   Neuraxial block will be primary anesthetic          Plan Factors:   Patient was not previously instructed to abstain from smoking on day of procedure.  Patient did not smoke on day of procedure.          Postoperative Plan: Postoperative administration of opioids is intended.        Informed Consent:    Anesthetic plan and risks discussed with patient.    Use of blood products discussed with: patient whom consented to blood products.

## 2021-10-13 NOTE — OP REPORT
Operative Report -     Patient: Aleyda Verma  MRN: [id number]         Date of Surgery: 10/13/2021    Pre-operative Diagnosis:   1. IUP at 38w0d  2. Early labor  3. Prior  x2, desires repeat  Post-operative Diagnosis: same  Procedure: Repeat Low Transverse  Section via Pfannenstiel incision  Surgeon(s): Jose  Assistant(s): Altagracia Suresh CNM  Anesthesia: Spinal  Findings: LV male infant, Apgars 8/8, weight 2450g; normal uterus, tubes and ovaries bilaterally.   Complications: none  Specimens: none  UOP: 300 mL  EBL: 500 mL    Indications:   Pt is a 36 y.o.  at 38w0d here for unscheduled repeat  performed for labor. The risks, benefits and alternatives of  section were discussed with the patient, including but not limited to infection, severe loss of blood, injury to bowel or bladder, injury to blood vessels, hysterectomy, injury to fetus, possible need for transfusion, pelvic pain, adhesive disease or scar tissue and risk of repeat cesareans vs. trial of labor after .  All questions were answered and patient consented to the procedure.      Procedure:  The patient was taken to the operating room where spinal anesthesia was placed and found to be adequate. She was prepped and draped in the normal sterile fashion in the dorsal supine position with a leftward tilt.    A Pfanenstiel skin incision was made with the scalpel and carried through to the underlying layer of fascia.  The fascia was then incised in the midline and the incision was extended laterally. The rectus muscles were  and the peritoneum entered. The peritoneal incision was then extended superiorly and inferiorly with good visualization of the bladder.    The Dennis retractor was inserted and the vesicouterine peritoneum identified, grasped with pick ups and entered sharply with Metzenbaum scissors.  The incision was extended laterally and the bladder flap created digitally.   The lower uterine segment incised in a low transverse fashion with the scalpel. The uterine incision was extended bluntly with cephalad-caudad traction.    The infant was delivered atraumatically; the nose and mouth were suctioned and the cord was clamped and cut. The infant was handed off to the waiting staff. The placenta was removed, and the uterus was cleared of all clots and debris.  The uterine incision was repaired with 0-monocryl in two layers.    The gutters were cleared of all clots and debris using copious irrigation. The uterus was then re-inspected to ensure hemostasis as were all subfascial tissues. The rectus muscle was re-approximated with 0-monocryl in a mattress fashion.  The fascia was re-approximated with 0-vicryl in a running fashion. The subcutaneous tissue was re-approximated using 3-0 vicryl in a running and interrupted fashion.  Notable thickening and scarring of camper's/pina's fascia due to prior abdominoplasty. The skin was closed with 4-0 vicryl.    The patient tolerated the procedure well. Sponge, lap and needle counts were correct times three. The patient was taken to recovery in stable condition.    Giovana Garcia D.O.

## 2021-10-14 PROCEDURE — 700111 HCHG RX REV CODE 636 W/ 250 OVERRIDE (IP): Performed by: OBSTETRICS & GYNECOLOGY

## 2021-10-14 PROCEDURE — 770002 HCHG ROOM/CARE - OB PRIVATE (112)

## 2021-10-14 PROCEDURE — 3E02340 INTRODUCTION OF INFLUENZA VACCINE INTO MUSCLE, PERCUTANEOUS APPROACH: ICD-10-PCS | Performed by: OBSTETRICS & GYNECOLOGY

## 2021-10-14 PROCEDURE — 90715 TDAP VACCINE 7 YRS/> IM: CPT | Performed by: OBSTETRICS & GYNECOLOGY

## 2021-10-14 PROCEDURE — A9270 NON-COVERED ITEM OR SERVICE: HCPCS | Performed by: STUDENT IN AN ORGANIZED HEALTH CARE EDUCATION/TRAINING PROGRAM

## 2021-10-14 PROCEDURE — 700102 HCHG RX REV CODE 250 W/ 637 OVERRIDE(OP): Performed by: OBSTETRICS & GYNECOLOGY

## 2021-10-14 PROCEDURE — A9270 NON-COVERED ITEM OR SERVICE: HCPCS | Performed by: ANESTHESIOLOGY

## 2021-10-14 PROCEDURE — 90686 IIV4 VACC NO PRSV 0.5 ML IM: CPT | Performed by: OBSTETRICS & GYNECOLOGY

## 2021-10-14 PROCEDURE — 3E0234Z INTRODUCTION OF SERUM, TOXOID AND VACCINE INTO MUSCLE, PERCUTANEOUS APPROACH: ICD-10-PCS | Performed by: OBSTETRICS & GYNECOLOGY

## 2021-10-14 PROCEDURE — 90471 IMMUNIZATION ADMIN: CPT

## 2021-10-14 PROCEDURE — A9270 NON-COVERED ITEM OR SERVICE: HCPCS | Performed by: OBSTETRICS & GYNECOLOGY

## 2021-10-14 PROCEDURE — 700102 HCHG RX REV CODE 250 W/ 637 OVERRIDE(OP): Performed by: ANESTHESIOLOGY

## 2021-10-14 PROCEDURE — 700102 HCHG RX REV CODE 250 W/ 637 OVERRIDE(OP): Performed by: STUDENT IN AN ORGANIZED HEALTH CARE EDUCATION/TRAINING PROGRAM

## 2021-10-14 RX ORDER — DIPHENHYDRAMINE HCL 25 MG
25 TABLET ORAL EVERY 6 HOURS PRN
Status: DISCONTINUED | OUTPATIENT
Start: 2021-10-14 | End: 2021-10-15 | Stop reason: HOSPADM

## 2021-10-14 RX ORDER — ACETAMINOPHEN 500 MG
1000 TABLET ORAL EVERY 6 HOURS PRN
Status: DISCONTINUED | OUTPATIENT
Start: 2021-10-14 | End: 2021-10-14

## 2021-10-14 RX ORDER — OXYCODONE HYDROCHLORIDE 5 MG/1
10 TABLET ORAL EVERY 4 HOURS PRN
Status: DISCONTINUED | OUTPATIENT
Start: 2021-10-14 | End: 2021-10-15 | Stop reason: HOSPADM

## 2021-10-14 RX ORDER — ACETAMINOPHEN 325 MG/1
975 TABLET ORAL EVERY 8 HOURS
Status: DISCONTINUED | OUTPATIENT
Start: 2021-10-14 | End: 2021-10-15 | Stop reason: HOSPADM

## 2021-10-14 RX ORDER — OXYCODONE HYDROCHLORIDE 5 MG/1
5 TABLET ORAL EVERY 4 HOURS PRN
Status: DISCONTINUED | OUTPATIENT
Start: 2021-10-14 | End: 2021-10-15 | Stop reason: HOSPADM

## 2021-10-14 RX ORDER — ONDANSETRON 2 MG/ML
4 INJECTION INTRAMUSCULAR; INTRAVENOUS EVERY 6 HOURS PRN
Status: DISCONTINUED | OUTPATIENT
Start: 2021-10-14 | End: 2021-10-15 | Stop reason: HOSPADM

## 2021-10-14 RX ORDER — CALCIUM CARBONATE 500 MG/1
500 TABLET, CHEWABLE ORAL DAILY
Status: DISCONTINUED | OUTPATIENT
Start: 2021-10-14 | End: 2021-10-15 | Stop reason: HOSPADM

## 2021-10-14 RX ORDER — ONDANSETRON 4 MG/1
4 TABLET, ORALLY DISINTEGRATING ORAL EVERY 6 HOURS PRN
Status: DISCONTINUED | OUTPATIENT
Start: 2021-10-14 | End: 2021-10-15 | Stop reason: HOSPADM

## 2021-10-14 RX ORDER — DIPHENHYDRAMINE HYDROCHLORIDE 50 MG/ML
25 INJECTION INTRAMUSCULAR; INTRAVENOUS EVERY 6 HOURS PRN
Status: DISCONTINUED | OUTPATIENT
Start: 2021-10-14 | End: 2021-10-15 | Stop reason: HOSPADM

## 2021-10-14 RX ADMIN — CLINDAMYCIN HYDROCHLORIDE 300 MG: 150 CAPSULE ORAL at 06:00

## 2021-10-14 RX ADMIN — OXYCODONE 10 MG: 5 TABLET ORAL at 19:53

## 2021-10-14 RX ADMIN — CLINDAMYCIN HYDROCHLORIDE 300 MG: 150 CAPSULE ORAL at 17:55

## 2021-10-14 RX ADMIN — CALCIUM CARBONATE 500 MG: 500 TABLET, CHEWABLE ORAL at 10:53

## 2021-10-14 RX ADMIN — CLINDAMYCIN HYDROCHLORIDE 300 MG: 150 CAPSULE ORAL at 13:12

## 2021-10-14 RX ADMIN — DOCUSATE SODIUM 100 MG: 100 CAPSULE ORAL at 06:39

## 2021-10-14 RX ADMIN — DOCUSATE SODIUM 100 MG: 100 CAPSULE ORAL at 19:49

## 2021-10-14 RX ADMIN — ACETAMINOPHEN 1000 MG: 500 TABLET ORAL at 10:53

## 2021-10-14 RX ADMIN — ACETAMINOPHEN 1000 MG: 500 TABLET ORAL at 19:49

## 2021-10-14 RX ADMIN — TETANUS TOXOID, REDUCED DIPHTHERIA TOXOID AND ACELLULAR PERTUSSIS VACCINE, ADSORBED 0.5 ML: 5; 2.5; 8; 8; 2.5 SUSPENSION INTRAMUSCULAR at 07:22

## 2021-10-14 RX ADMIN — INFLUENZA A VIRUS A/VICTORIA/2570/2019 IVR-215 (H1N1) ANTIGEN (FORMALDEHYDE INACTIVATED), INFLUENZA A VIRUS A/TASMANIA/503/2020 IVR-221 (H3N2) ANTIGEN (FORMALDEHYDE INACTIVATED), INFLUENZA B VIRUS B/PHUKET/3073/2013 ANTIGEN (FORMALDEHYDE INACTIVATED), AND INFLUENZA B VIRUS B/WASHINGTON/02/2019 ANTIGEN (FORMALDEHYDE INACTIVATED) 0.5 ML: 15; 15; 15; 15 INJECTION, SUSPENSION INTRAMUSCULAR at 07:24

## 2021-10-14 RX ADMIN — ACETAMINOPHEN 1000 MG: 500 TABLET ORAL at 04:42

## 2021-10-14 ASSESSMENT — PAIN DESCRIPTION - PAIN TYPE
TYPE: ACUTE PAIN
TYPE: SURGICAL PAIN

## 2021-10-14 NOTE — CARE PLAN
The patient is Stable - Low risk of patient condition declining or worsening    Shift Goals  Clinical Goals: pain management, VS WDL, lochia light    Progress made toward(s) clinical / shift goals:  Pain controlled with scheduled pain medication, VS WDL, and lochia is light    Patient is not progressing towards the following goals:

## 2021-10-14 NOTE — PROGRESS NOTES
2035 Assessment completed. Lochia light, fundus firm. Plan of care reviewed. Declines pain intervention at this time, will call if pain intervention needed.

## 2021-10-14 NOTE — CONSULTS
"Met with MOB for an initial lactation visit.  MOB delivered her third baby yesterday, 10/13/21, at 1111 at 38 weeks gestation.  Risk factors for breastfeeding are: advanced maternal age, history of low milk supply with her first and second babies and increased BMI of 37.11.  MOB reported she attempted to breastfeed her second baby for \"weeks only,\" but stopped due to decreased milk supply and infant losing too much weight.  MOB stated she is hoping to exclusively breastfeed this infant.  MOB reported supplementing infant with formula last night due to infant's weight loss.  Infant's weight loss at 2035 was 0.49%.  Infant is stooling and voiding within defined limits.  Educated MOB on signs of successful milk transfer.  MOB stated infant has been fussy at the breast.    Latch attempt made at the left breast in the cross cradle position.  Infant made 1-2 attempts at latching onto the breast before falling asleep at the breast.  Subsequent latch attempts were not successful.  Infant remained asleep and no hunger cues were present.  Infant is now over 24 hours old.  MOB reported infant last received formula at 0630 this morning.    Three step feeding plan consisting of: breastfeeding first, supplementation with formula and/or expressed breast milk per hospital supplementation guidelines, and pumping as instructed to protect milk supply.  Pumping was initiated last night by NOC RN per MOB.    MOB provided with hospital supplementation guidelines along with instructions on use.  MOB was taught how to perform paced bottle feeding by RN and she was able to perform a successful return demonstration.  FOB was taught paced bottle feeding during this visit by MOB and this LC.  He was also able to perform a successful return demonstration.    Fit of 25 mm flanges appeared to fit appropriately at each breast.  Reviewed pump settings and they were: 80 CPM down to 60 after 2 minutes/suction set to comfort at 48%/pumps for 15 " minutes.  MOB was encouraged to perform hand expression at each breast after each pumping session.  She stated she is able to perform hand expression independently.    MOB was encouraged to call for latch assistance as needed.    Discussed outpatient breastfeeding resources with MOB and written information provided.    MOB verbalized understanding of all information provided to her and denied having any further questions at this time. Encouraged MOB to call for lactation assistance as needed.

## 2021-10-14 NOTE — PROGRESS NOTES
0947- Report received from Ashley ZHANG. Care assumed, POC discussed with pt. Dr. Garcia at BS to evaluate pt. C Section called at this time.     1111- Delivery of viably baby boy, 8/8 APGARS.     1320- Pt transported to PP unit via gurney with infant in arms, FOB and RN at side. All pt belongings in their possession. BS report given to Swathi ZHANG. Pt and infant stable. ID bands checked and verified by 2 RNs. Pt and FOB deny any questions or concerns at this time.

## 2021-10-14 NOTE — PROGRESS NOTES
Telephone call to Dr. Garcia regarding patient's prescription to take Clindamycin while in hospital. Dr. Garcia would like patient to continue to take antibiotic and is okay with patient taking own medication from home. Will take medication down to pharmacy for verification.

## 2021-10-14 NOTE — PROGRESS NOTES
" Post Partum C/S Progress Note  1 Day Post-Op    Subjective:  Doing well without significant complaints.  Pain controlled on current medication (scheduled tylenol and had 1 dose of cayla last night at 9PM).  Voiding without difficulty, normal lochia.  Supplementing formular for baby due to wt loss already.  Encouraged that baby's stomach is small and that milk is not supposed to be \"in\" yet, just colostrum.    Vitals: /76   Pulse 70   Temp 36.3 °C (97.4 °F) (Temporal)   Resp 17   Ht 1.727 m (5' 7.99\")   Wt 111 kg (244 lb)   LMP 2021   SpO2 96%   Breastfeeding Yes   BMI 37.11 kg/m²   Temp (24hrs), Av.4 °C (97.5 °F), Min:36.2 °C (97.2 °F), Max:36.5 °C (97.7 °F)      I&O:    Intake/Output Summary (Last 24 hours) at 10/14/2021 0641  Last data filed at 10/14/2021 0130  Gross per 24 hour   Intake --   Output 750 ml   Net -750 ml       Exam:    GEN: Patient without distress.  Abd: soft, fundus firm at level of umbilicus, non tender.    Incision: clean and dry without erythema. Steris intact  Ext: negative for swelling, cords or tenderness.    Labs:   Recent Labs     10/13/21  0920 10/13/21  2036   WBC 9.4 14.8*   RBC 4.47 3.65*   HEMOGLOBIN 12.5 10.3*   HEMATOCRIT 37.7 31.3*   MCV 84.3 85.8   MCH 28.0 28.2   RDW 43.8 44.3   PLATELETCT 338 265   MPV 10.9 10.8   NEUTSPOLYS 79.10*  --    LYMPHOCYTES 17.10*  --    MONOCYTES 3.10  --    EOSINOPHILS 0.10  --    BASOPHILS 0.20  --          Assesment: 36 y.o. @GP s/p LTCS  1 Day Post-Op    Plan:    - Continue routine post-op care and maternal education.    - H/H appropriate for postop       Signed By:   Giovana Garcia D.O.                   "

## 2021-10-15 ENCOUNTER — PHARMACY VISIT (OUTPATIENT)
Dept: PHARMACY | Facility: MEDICAL CENTER | Age: 36
End: 2021-10-15
Payer: COMMERCIAL

## 2021-10-15 VITALS
HEIGHT: 68 IN | WEIGHT: 244 LBS | SYSTOLIC BLOOD PRESSURE: 101 MMHG | HEART RATE: 73 BPM | OXYGEN SATURATION: 99 % | DIASTOLIC BLOOD PRESSURE: 65 MMHG | RESPIRATION RATE: 18 BRPM | BODY MASS INDEX: 36.98 KG/M2 | TEMPERATURE: 97.8 F

## 2021-10-15 PROCEDURE — A9270 NON-COVERED ITEM OR SERVICE: HCPCS | Performed by: STUDENT IN AN ORGANIZED HEALTH CARE EDUCATION/TRAINING PROGRAM

## 2021-10-15 PROCEDURE — RXMED WILLOW AMBULATORY MEDICATION CHARGE: Performed by: NURSE PRACTITIONER

## 2021-10-15 PROCEDURE — A9270 NON-COVERED ITEM OR SERVICE: HCPCS | Performed by: OBSTETRICS & GYNECOLOGY

## 2021-10-15 PROCEDURE — 700102 HCHG RX REV CODE 250 W/ 637 OVERRIDE(OP): Performed by: STUDENT IN AN ORGANIZED HEALTH CARE EDUCATION/TRAINING PROGRAM

## 2021-10-15 PROCEDURE — 700102 HCHG RX REV CODE 250 W/ 637 OVERRIDE(OP): Performed by: OBSTETRICS & GYNECOLOGY

## 2021-10-15 RX ORDER — PSEUDOEPHEDRINE HCL 30 MG
100 TABLET ORAL 2 TIMES DAILY PRN
Qty: 60 CAPSULE | Refills: 1 | Status: SHIPPED | OUTPATIENT
Start: 2021-10-15 | End: 2022-01-10

## 2021-10-15 RX ORDER — ACETAMINOPHEN 500 MG
1000 TABLET ORAL EVERY 8 HOURS
Qty: 30 TABLET | Refills: 0 | Status: ON HOLD | OUTPATIENT
Start: 2021-10-15 | End: 2023-09-22

## 2021-10-15 RX ORDER — OXYCODONE HYDROCHLORIDE 5 MG/1
5 TABLET ORAL EVERY 4 HOURS PRN
Qty: 20 TABLET | Refills: 0 | Status: SHIPPED | OUTPATIENT
Start: 2021-10-15 | End: 2021-10-20

## 2021-10-15 RX ADMIN — CALCIUM CARBONATE 500 MG: 500 TABLET, CHEWABLE ORAL at 06:00

## 2021-10-15 RX ADMIN — CLINDAMYCIN HYDROCHLORIDE 300 MG: 150 CAPSULE ORAL at 06:00

## 2021-10-15 RX ADMIN — OXYCODONE 10 MG: 5 TABLET ORAL at 00:18

## 2021-10-15 RX ADMIN — OXYCODONE 10 MG: 5 TABLET ORAL at 04:28

## 2021-10-15 RX ADMIN — ACETAMINOPHEN 975 MG: 325 TABLET ORAL at 05:52

## 2021-10-15 ASSESSMENT — EDINBURGH POSTNATAL DEPRESSION SCALE (EPDS)
I HAVE FELT SCARED OR PANICKY FOR NO GOOD REASON: NO, NOT AT ALL
I HAVE BEEN SO UNHAPPY THAT I HAVE HAD DIFFICULTY SLEEPING: NOT AT ALL
THE THOUGHT OF HARMING MYSELF HAS OCCURRED TO ME: NEVER
I HAVE FELT SAD OR MISERABLE: NO, NOT AT ALL
I HAVE BLAMED MYSELF UNNECESSARILY WHEN THINGS WENT WRONG: NOT VERY OFTEN
I HAVE LOOKED FORWARD WITH ENJOYMENT TO THINGS: AS MUCH AS I EVER DID
I HAVE BEEN ABLE TO LAUGH AND SEE THE FUNNY SIDE OF THINGS: AS MUCH AS I ALWAYS COULD
I HAVE BEEN SO UNHAPPY THAT I HAVE BEEN CRYING: NO, NEVER
THINGS HAVE BEEN GETTING ON TOP OF ME: NO, I HAVE BEEN COPING AS WELL AS EVER
I HAVE BEEN ANXIOUS OR WORRIED FOR NO GOOD REASON: NO, NOT AT ALL

## 2021-10-15 ASSESSMENT — PAIN DESCRIPTION - PAIN TYPE
TYPE: SURGICAL PAIN

## 2021-10-15 NOTE — CARE PLAN
Problem: Altered Physiologic Condition  Goal: Patient physiologically stable as evidenced by normal lochia, palpable uterine involution and vitals within normal limits  Outcome: Progressing     Problem: Pain - Standard  Goal: Alleviation of pain or a reduction in pain to the patient’s comfort goal  Outcome: Progressing     Problem: Bowel Elimination - Post Surgical  Goal: Patient will resume regular bowel sounds and function with no discomfort or distention  Outcome: Progressing   The patient is hemodynamically stable    Shift Goals: pain controlled, minimal bleeding, ambulation, rest  Clinical Goals: pain management, maintaining uterine good tone, prevent bladder distention, ambulation    Progress made toward(s) clinical / shift goals: pt verbalized acceptable level of pain     Patient is not progressing towards the following goals:

## 2021-10-15 NOTE — DISCHARGE INSTRUCTIONS
PATIENT DISCHARGE EDUCATION INSTRUCTION SHEET  REASONS TO CALL YOUR OBSTETRICIAN  · Persistent fever, shaking, chills (Temperature higher than 100.4) may indicate you have an infection  · Heavy bleeding: soaking more than 1 pad per hour; Passing clots an egg-sized clot or bigger may mean you have an postpartum hemorrhage  · Foul odor from vagina or bad smelling or discolored discharge or blood  · Breast infection (Mastitis symptoms); breast pain, chills, fever, redness or red streaks, may feel flu like symptoms  · Urinary pain, burning or frequency  · Incision that is not healing, increased redness, swelling, tenderness or pain, or any pus from episiotomy or  site may mean you have an infection  · Redness, swelling, warmth, or painful to touch in the calf area of your leg may mean you have a blood clot  · Severe or intensified depression, thoughts or feelings of wanting to hurt yourself or someone else   · Pain in chest, obstructed breathing or shortness of breath (trouble catching your breath) may mean you are having a postpartum complication. Call your provider immediately   · Headache that does not get better, even after taking medicine, a bad headache with vision changes or pain in the upper right area of your belly may mean you have high blood pressure or post birth preeclampsia. Call your provider immediately    HAND WASHING  All family and friends should wash their hands:  · Before and after holding the baby  · Before feeding the baby  · After using the restroom or changing the baby's diaper    WOUND CARE  Ask your physician for additional care instructions. In general:  ·  Incision:  · May shower and pat incision dry   · Keep the incision clean and dry  · There should not be any opening or pus from the incision  · Continue to walk at home 3 times a day   · Do NOT lift anything heavier than your baby (over 10 pounds)  · Encourage family to help participate in care of the  to allow  rest and mom time to heal  · Episiotomy/Laceration  · May use kathe-spray bottle, witch hazel pads and dermaplast spray for comfort  · Use kathe-spray bottle after urinating to cleanse perineal area  · To prevent burning during urination spray kathe-water bottle on labial area   · Pat perineal area dry until episiotomy/laceration is healed  · Continue to use kathe-bottle until bleeding stops as needed  · If have a 2nd degree laceration or greater, a Sitz bath can offer relief from soreness, burning, and inflammation   · Sitz Bath   · Sit in 6 inches of warm water and soak laceration as needed until the laceration heals    VAGINAL CARE AND BLEEDING  · Nothing inside vagina for 6 weeks:   · No sexual intercourse, tampons or douching  · Bleeding may continue for 2-4 weeks. Amount and color may vary  · Soaking 1 pad or more in an hour for several hours is considered heavy bleeding  · Passing large egg sized blood clots can be concerning  · If you feel like you have heavy bleeding or are having increasing amount of blood clots call your Obstetrician immediately  · If you begin feeling faint upon standing, feeling sick to your stomach, have clammy skin, a really fast heartbeat, have chills, start feeling confused, dizzy, sleepy or weak, or feeling like you're going to faint call your Obstetrician immediately    HYPERTENSION   Preeclampsia or gestational hypertension are types of high blood pressure that only pregnant women can get. It is important for you to be aware of symptoms to seek early intervention and treatment. If you have any of these symptoms immediately call your Obstetrician    · Vision changes or blurred vision   · Severe headache or pain that is unrelieved with medication and will not go away  · Persistent pain in upper abdomen or shoulder   · Increased swelling of face, feet, or hands  · Difficulty breathing or shortness of breath at rest  · Urinating less than usual    URINATION AND BOWEL MOVEMENTS  · Eating  "more fiber (bran cereal, fruits, and vegetables) and drinking plenty of fluids will help to avoid constipation  · Urinary frequency and urgency after childbirth is normal  · If you experience any urinary pain, burning or frequency call your provider    BIRTH CONTROL  · It is possible to become pregnant at any time after delivery and while breastfeeding  · Plan to discuss a method of birth control with your physician at your post delivery follow up visit    POSTPARTUM BLUES  During the first few days after birth, you may experience a sense of the \"blues\" which may include impatience, irritability or even crying. These feelings come and go quickly. However, as many as 1 in 10 women experience emotional symptoms known as postpartum depression.     POSTPARTUM DEPRESSION    May start as early as the second or third day after delivery or take several weeks or months to develop. Symptoms of \"blues\" are present, but are more intense: Crying spells; loss of appetite; feelings of hopelessness or loss of control; fear of touching the baby; over concern or no concern at all about the baby; little or no concern about your own appearance/caring for yourself; and/or inability to sleep or excessive sleeping. Contact your Obstetrician if you are experiencing any of these symptoms     PREVENTING SHAKEN BABY  If you are angry or stressed, PUT THE BABY IN THE CRIB, step away, take some deep breaths, and wait until you are calm to care for the baby. DO NOT SHAKE THE BABY. You are not alone, call a supporter for help.  · [facility] 24/7 crisis call line (485-386-1891) or (1-690.406.9782)  · You can also text them, text \"ANSWER\" (841255)      "

## 2021-10-15 NOTE — CARE PLAN
The patient is Stable - Low risk of patient condition declining or worsening    Shift Goals  Clinical Goals: Pain control     Progress made toward(s) clinical / shift goals:  VSS    Patient is not progressing towards the following goals:

## 2021-10-15 NOTE — DISCHARGE PLANNING
Meds-to-Beds: Discharge prescription orders listed below delivered to patient's bedside. LEATHA Alston notified. Patient counseled. Patient elected to have co-payment billed to patient account.     Current Outpatient Medications   Medication Sig Dispense Refill   • acetaminophen (TYLENOL) 500 MG Tab Take 2 Tablets by mouth every 8 hours. 30 Tablet 0   • docusate sodium 100 MG Cap Take 1 capsule by mouth 2 times a day as needed for Constipation. 60 Capsule 1   • oxyCODONE immediate-release (ROXICODONE) 5 MG Tab Take 1 Tablet by mouth every four hours as needed for Severe Pain for up to 5 days. 20 Tablet 0      Estrellita Valencia, PharmD

## 2021-10-15 NOTE — PROGRESS NOTES
Discussed discharge education, and follow up information for infant and MOB. Infant and MOB's bands matched. Cord clamp off. Cuddles removed. Infant placed in car seat by MOB. Checked per RN. Infant and MOB escorted by RN to [facility]. Infant and MOB in stable condition.

## 2021-10-15 NOTE — DISCHARGE SUMMARY
Discharge Summary:      Aleyda Verma    Admit Date:   10/13/2021  Discharge Date:  10/15/2021     Admitting diagnosis:  Labor and delivery indication for care or intervention [O75.9]  Discharge Diagnosis: Status post  for repeat.  Pregnancy Complications: none  Tubal Ligation:  yes        History:  Past Medical History:   Diagnosis Date   • Asthma     In childhood; hasn't used inhaler in 4 years per pt     OB History    Para Term  AB Living   4 3 3   1 3   SAB TAB Ectopic Molar Multiple Live Births   1   0   0 3      # Outcome Date GA Lbr Tylor/2nd Weight Sex Delivery Anes PTL Lv   4 Term 10/13/21 38w0d  2.45 kg (5 lb 6.4 oz) M CS-LTranv Spinal N MEAGHAN   3 Term 19 38w4d  2.22 kg (4 lb 14.3 oz) F CS-LTranv Spinal  MEAGHAN      Birth Comments: SGA   2 SAB 2016           1 Term 07/04/10 40w0d  3.118 kg (6 lb 14 oz) M CS-LTranv   MEAGHAN      Complications: Cephalopelvic Disproportion        Sulfa drugs, Ibuprofen, K-y jelly [lubricating jelly], and Latex  Patient Active Problem List    Diagnosis Date Noted   • Postpartum care following  delivery 10/15/2021   • Boil of buttock 10/06/2021   • High-risk pregnancy supervision, third trimester 2021   • Hx of  sectionx2 2021   • Obesity complicating pregnancy in third trimester 2021   • Premature labor 2021        Hospital Course:   36 y.o. , now para 3, was admitted with the above mentioned diagnosis, underwent Repeat  repeat,  for repeat. Patient postpartum course was unremarkable, with progressive advancement in diet , ambulation and toleration of oral analgesia. Patient without complaints today and desires discharge.      Vitals:    10/14/21 0600 10/14/21 1037 10/14/21 1812 10/15/21 0600   BP: 135/83 121/74 114/70 101/65   Pulse: 75 87 78 73   Resp: 16 18 18 18   Temp: 36.1 °C (97 °F) 36.7 °C (98.1 °F) 36.5 °C (97.7 °F) 36.6 °C (97.8 °F)   TempSrc: Temporal Temporal Temporal Temporal    SpO2: 92% 100% 98% 99%   Weight:       Height:           Current Facility-Administered Medications   Medication Dose   • acetaminophen (Tylenol) tablet 975 mg  975 mg   • oxyCODONE immediate-release (ROXICODONE) tablet 5 mg  5 mg   • ondansetron (ZOFRAN) syringe/vial injection 4 mg  4 mg    Or   • ondansetron (ZOFRAN ODT) dispertab 4 mg  4 mg   • diphenhydrAMINE (BENADRYL) tablet/capsule 25 mg  25 mg    Or   • diphenhydrAMINE (BENADRYL) injection 25 mg  25 mg   • oxyCODONE immediate-release (ROXICODONE) tablet 10 mg  10 mg   • calcium carbonate (TUMS) chewable tab 500 mg  500 mg   • LR infusion     • PRN oxytocin (PITOCIN) (20 Units/1000 mL) PRN for excessive uterine bleeding - See Admin Instr  125-999 mL/hr   • docusate sodium (COLACE) capsule 100 mg  100 mg   • simethicone (MYLICON) chewable tab 80 mg  80 mg   • oxytocin (PITOCIN) infusion (for postpartum)   mL/hr   • miSOPROStol (CYTOTEC) tablet 800 mcg  800 mcg   • methylergonovine (METHERGINE) injection 0.2 mg  0.2 mg   • clindamycin (CLEOCIN) capsule 300 mg  300 mg       Exam:  Breast Exam: negative  Abdomen: Abdomen soft, non-tender. BS normal. No masses,  No organomegaly  Fundus Non Tender: yes  Incision: dry and intact  Perineum: perineum intact  Extremity: extremities, peripheral pulses and reflexes normal, no edema, redness or tenderness in the calves or thighs     Labs:  Recent Labs     10/13/21  0920 10/13/21  2036   WBC 9.4 14.8*   RBC 4.47 3.65*   HEMOGLOBIN 12.5 10.3*   HEMATOCRIT 37.7 31.3*   MCV 84.3 85.8   MCH 28.0 28.2   MCHC 33.2* 32.9*   RDW 43.8 44.3   PLATELETCT 338 265   MPV 10.9 10.8        Activity:   Discharge to home  Pelvic Rest x 6 weeks    Assessment:  normal postpartum course  Discharge Assessment: No areas of skin breakdown/redness; surgical incision intact/healing     Follow up: [facility] in 5 weeks for vaginal ; 1 week for incision check.      Discharge Meds:   Current Outpatient Medications   Medication Sig  Dispense Refill   • acetaminophen (TYLENOL) 500 MG Tab Take 2 Tablets by mouth every 8 hours. 30 Tablet 0   • docusate sodium 100 MG Cap Take 100 mg by mouth 2 times a day as needed for Constipation. 60 Capsule 1   • oxyCODONE immediate-release (ROXICODONE) 5 MG Tab Take 1 Tablet by mouth every four hours as needed for Severe Pain for up to 5 days. 20 Tablet 0       MEREDITH Peters.

## 2021-10-15 NOTE — LACTATION NOTE
"Met with MOB for a lactation follow up visit.  MOB reported infant is waking up for feeds and is latching onto her breast without concerns.  MOB continues to follow her three step feeding plan and was told to continue to do so at home post discharge due to previous history of low milk supply.  MOB stated she continues to receive 1-2 drops of colostrum total with each pumping session.    MOB stated she will be renting a hospital grade breast pump at discharge.  Business hours and location of the [facility] provided to her and FOB.    A lactation referral will be made with [facility] on M[facility]'s behalf for evaluation of milk supply.    See this LC's previous chart note for description of three step feeding plan.    Written information provided:  1.  CDC Milk Storage Guidelines  2.  \"What About Pacifiers\"    MOB educated on delaying pacifier use for the first 4 weeks post delivery (until breastfeeding has been established) with rationale provided at the initial lactation visit.    Observed MOB place infant to her left breast in the cross cradle position.  Deep latch achieved with good nutritive suck observed.  Infant alert with feed. MOB denied pain with latch.  See lactation assessment flow sheet under infant's chart for latch score and assessment findings.    MOB verbalized understanding of all information provided to her and denied having any further questions at this time. Encouraged MOB to call for lactation assistance as needed prior to discharge.  "

## 2021-10-15 NOTE — PROGRESS NOTES
1950 Pt doing well bonding with baby, Assessment done wound covered with Tegaderm clean and dry, pt complained of moderate abdominal pain medicated her as per doctor orders, Encourage more ambulation, Encourage to call for assistance, Needs attended.

## 2021-11-10 ENCOUNTER — APPOINTMENT (OUTPATIENT)
Dept: OBGYN | Facility: CLINIC | Age: 36
End: 2021-11-10
Payer: COMMERCIAL

## 2021-11-10 ENCOUNTER — POST PARTUM (OUTPATIENT)
Dept: OBGYN | Facility: CLINIC | Age: 36
End: 2021-11-10
Payer: COMMERCIAL

## 2021-11-10 VITALS — DIASTOLIC BLOOD PRESSURE: 71 MMHG | BODY MASS INDEX: 35.44 KG/M2 | WEIGHT: 233 LBS | SYSTOLIC BLOOD PRESSURE: 131 MMHG

## 2021-11-10 DIAGNOSIS — Z51.89 VISIT FOR WOUND CHECK: ICD-10-CM

## 2021-11-10 PROCEDURE — 90050 PR POSTPARTUM VISIT: CPT | Performed by: OBSTETRICS & GYNECOLOGY

## 2021-11-10 ASSESSMENT — FIBROSIS 4 INDEX: FIB4 SCORE: 0.55

## 2021-11-10 NOTE — PROGRESS NOTES
Aleyda Verma Is a 36 y.o.  status post repeat  delivery on 10/13/21. Patient is here today for an incision check. Currently the patient is without complaints.  She denies pain.  She is having some light bleeding.  She is breast and bottlefeeding without difficulty.  No signs of depression or anxiety.    /71   Wt 106 kg (233 lb)   LMP 2021   BMI 35.44 kg/m²   ABD Abdomen soft, non-tender. BS normal. No masses or organomegaly  Incision well healed, dry and intact      Assessment and plan  Status post  delivery  No complications incision healing well  Followup in 4 weeks for final postpartum checkup  Declines contraception  Pap smear from  is within normal limits from [facility]

## 2021-11-11 ENCOUNTER — TELEPHONE (OUTPATIENT)
Dept: SCHEDULING | Facility: IMAGING CENTER | Age: 36
End: 2021-11-11

## 2021-11-12 ENCOUNTER — OFFICE VISIT (OUTPATIENT)
Dept: MEDICAL GROUP | Facility: MEDICAL CENTER | Age: 36
End: 2021-11-12
Payer: COMMERCIAL

## 2021-11-12 VITALS
TEMPERATURE: 97 F | HEIGHT: 68 IN | WEIGHT: 236.8 LBS | HEART RATE: 96 BPM | BODY MASS INDEX: 35.89 KG/M2 | DIASTOLIC BLOOD PRESSURE: 70 MMHG | SYSTOLIC BLOOD PRESSURE: 116 MMHG | OXYGEN SATURATION: 96 %

## 2021-11-12 DIAGNOSIS — H92.01 EAR PAIN, RIGHT: ICD-10-CM

## 2021-11-12 PROBLEM — Z98.891 HISTORY OF CESAREAN SECTION: Status: ACTIVE | Noted: 2018-06-19

## 2021-11-12 PROBLEM — Z87.59 HISTORY OF ECTOPIC PREGNANCY: Status: ACTIVE | Noted: 2018-06-04

## 2021-11-12 PROCEDURE — 99213 OFFICE O/P EST LOW 20 MIN: CPT | Performed by: FAMILY MEDICINE

## 2021-11-12 ASSESSMENT — FIBROSIS 4 INDEX: FIB4 SCORE: 0.55

## 2021-11-12 NOTE — PROGRESS NOTES
"Subjective:     CC: \"Right ear pain\"    HPI:   Aleyda presents today with valuation and management of right ear pain. Patient endorses 2 to 3 weeks of right-sided ear pain, throat pain.  She tells me that after coming home from delivering her newest baby about a month ago a cold ran through the house, and everyone seemed to get it.  They are all feeling better but she has had persistent ear pain/pressure.  She takes Tylenol which does help with the pain but she does not take much else because she is breast-feeding.  She denies fevers, chills, body aches, sinus drainage, nausea, vomiting.      Current Outpatient Medications Ordered in Epic   Medication Sig Dispense Refill   • acetaminophen (TYLENOL) 500 MG Tab Take 2 Tablets by mouth every 8 hours. 30 Tablet 0   • Prenatal Vit-Fe Fumarate-FA (PRENATAL 1+1 PO) Take  by mouth.     • docusate sodium 100 MG Cap Take 1 capsule by mouth 2 times a day as needed for Constipation. (Patient not taking: Reported on 11/10/2021) 60 Capsule 1     No current Epic-ordered facility-administered medications on file.       Health Maintenance: Completed    ROS:  See HPI    Objective:     Exam:  /70   Pulse 96   Temp 36.1 °C (97 °F) (Temporal)   Ht 1.727 m (5' 8\")   Wt 107 kg (236 lb 12.8 oz)   LMP 01/20/2021   SpO2 96%   BMI 36.01 kg/m²  Body mass index is 36.01 kg/m².    Physical Exam  Vitals reviewed.   Constitutional:       Appearance: Normal appearance. She is not ill-appearing.   HENT:      Head: Normocephalic and atraumatic.      Right Ear: Ear canal normal. A middle ear effusion is present. Tympanic membrane is not scarred, perforated, erythematous, retracted or bulging.      Left Ear: Tympanic membrane and ear canal normal.      Mouth/Throat:      Mouth: Mucous membranes are moist.      Pharynx: Oropharynx is clear. No oropharyngeal exudate or posterior oropharyngeal erythema.   Eyes:      Extraocular Movements: Extraocular movements intact.      Pupils: Pupils are " equal, round, and reactive to light.   Cardiovascular:      Rate and Rhythm: Normal rate.      Heart sounds: Normal heart sounds. No murmur heard.      Pulmonary:      Effort: Pulmonary effort is normal.      Breath sounds: Normal breath sounds.   Abdominal:      General: Abdomen is flat.      Palpations: Abdomen is soft.   Skin:     General: Skin is warm and dry.   Neurological:      Mental Status: She is alert and oriented to person, place, and time.   Psychiatric:         Mood and Affect: Mood normal.         Behavior: Behavior normal.           Assessment & Plan:     36 y.o. female with the following -     Problem List Items Addressed This Visit     Ear pain, right     Per my exam today both tympanic membranes look good, maybe a little bit of fluid in the inner ear on the right side, throat is clear.  Do not see any signs of an acute bacterial infection at this time.  Patient to try over-the-counter at home decongestants we discussed using Flonase and saline rinses and allergy medicine like Zyrtec or Benadryl.  Did discuss that it can make her sleepy.  She will try this over the next week to see if that helps clear what may be some underlying congestion.  If she is not getting better and certainly if she worsens she is to contact me and we can look at doing a short course of most likely Augmentin.                 Return if symptoms worsen or fail to improve.    Please note that this dictation was created using voice recognition software. I have made every reasonable attempt to correct obvious errors, but I expect that there are errors of grammar and possibly content that I did not discover before finalizing the note.

## 2021-11-12 NOTE — ASSESSMENT & PLAN NOTE
Per my exam today both tympanic membranes look good, maybe a little bit of fluid in the inner ear on the right side, throat is clear.  Do not see any signs of an acute bacterial infection at this time.  Patient to try over-the-counter at home decongestants we discussed using Flonase and saline rinses and allergy medicine like Zyrtec or Benadryl.  Did discuss that it can make her sleepy.  She will try this over the next week to see if that helps clear what may be some underlying congestion.  If she is not getting better and certainly if she worsens she is to contact me and we can look at doing a short course of most likely Augmentin.

## 2022-01-05 ENCOUNTER — TELEMEDICINE (OUTPATIENT)
Dept: TELEHEALTH | Facility: TELEMEDICINE | Age: 37
End: 2022-01-05
Payer: COMMERCIAL

## 2022-01-05 DIAGNOSIS — H10.012 ACUTE FOLLICULAR CONJUNCTIVITIS OF LEFT EYE: ICD-10-CM

## 2022-01-05 PROCEDURE — 99213 OFFICE O/P EST LOW 20 MIN: CPT | Mod: 95 | Performed by: NURSE PRACTITIONER

## 2022-01-05 RX ORDER — POLYMYXIN B SULFATE AND TRIMETHOPRIM 1; 10000 MG/ML; [USP'U]/ML
1 SOLUTION OPHTHALMIC EVERY 4 HOURS
Qty: 10 ML | Refills: 0 | Status: ON HOLD | OUTPATIENT
Start: 2022-01-05 | End: 2023-09-10

## 2022-01-06 NOTE — PROGRESS NOTES
Virtual Visit: Established Patient   This visit was conducted via Zoom using secure and encrypted videoconferencing technology.   The patient was in a private location in the state of [place].    The patient's identity was confirmed and verbal consent was obtained for this virtual visit.    Subjective:   CC: stye on eye    Aleyda Verma is a 37 y.o. female presenting for evaluation and management of: a stye on her left eye over the past several weeks, has tried OTC medications, warm compress, and prior RX erythromycin (but not consecturively)  without relief. Denies vision changes, headaches.      ROS   All other systems are negative except as documented above within HPI.      Current medicines (including changes today)  Current Outpatient Medications   Medication Sig Dispense Refill   • acetaminophen (TYLENOL) 500 MG Tab Take 2 Tablets by mouth every 8 hours. 30 Tablet 0   • docusate sodium 100 MG Cap Take 1 capsule by mouth 2 times a day as needed for Constipation. (Patient not taking: Reported on 11/10/2021) 60 Capsule 1   • Prenatal Vit-Fe Fumarate-FA (PRENATAL 1+1 PO) Take  by mouth.       No current facility-administered medications for this visit.       Patient Active Problem List    Diagnosis Date Noted   • Ear pain, right 2021   • Postpartum care following  delivery 10/15/2021   • Boil of buttock 10/06/2021   • High-risk pregnancy supervision, third trimester 2021   • Obesity complicating pregnancy in third trimester 2021   • Premature labor 2021   • History of  section 2018   • History of ectopic pregnancy 2018   • History of cervical dysplasia 2014   • History of gastric bypass 2014   • Migraine without aura 2011        Objective:   There were no vitals taken for this visit.    Physical Exam:  Constitutional: Alert, no distress, well-groomed.  Skin: No rashes in visible areas.  Eye: Round. Conjunctiva clear, left upper lid hordeolum,  minimal erythema,  No icterus.   ENMT: Lips pink without lesions, good dentition, moist mucous membranes. Phonation normal.  Neck: No masses, no thyromegaly. Moves freely without pain.  Respiratory: Unlabored respiratory effort, no cough or audible wheeze  Psych: Alert and oriented x3, normal affect and mood.     Assessment and Plan:   The following treatment plan was discussed:     1. Acute follicular conjunctivitis of left eye  - polymixin-trimethoprim (POLYTRIM) 71403-1.1 UNIT/ML-% Solution; Administer 1 Drop into both eyes every 4 hours.  Dispense: 10 mL; Refill: 0    Patient is stable and appropriate for outpatient/telehealth visit.  Begin medications as listed. Discussed management options (risks, benefits, and alternatives to treatment).     Advised the patient to follow-up with the primary care provider for recheck, reevaluation, and/or consideration of further management if necessary. Return to urgent care with any worsening symptoms or if there is no improvement in their current condition. Red flags discussed and indications to immediately call 911 or present to the ED.  All questions were encouraged and answered to the patient's satisfaction and understanding, and they agree to the plan of care.     I personally reviewed prior external notes and test results pertinent to today's visit.  I have independently reviewed and interpreted all diagnostics ordered during this urgent care acute visit. Time spent evaluating this patient was a minimum of 30 minutes and includes preparing for visit, counseling/education, exam, evaluation, obtaining history, and ordering lab/test/procedures.      Please note that this dictation was created using voice recognition software. I have made a reasonable attempt to correct obvious errors, but I expect that there are errors of grammar and possibly content that I did not discover before finalizing the note.

## 2022-11-11 ENCOUNTER — OFFICE VISIT (OUTPATIENT)
Dept: URGENT CARE | Facility: PHYSICIAN GROUP | Age: 37
End: 2022-11-11
Payer: COMMERCIAL

## 2022-11-11 ENCOUNTER — HOSPITAL ENCOUNTER (OUTPATIENT)
Facility: MEDICAL CENTER | Age: 37
End: 2022-11-11
Attending: PHYSICIAN ASSISTANT
Payer: COMMERCIAL

## 2022-11-11 VITALS
TEMPERATURE: 97.8 F | BODY MASS INDEX: 39.55 KG/M2 | SYSTOLIC BLOOD PRESSURE: 124 MMHG | HEIGHT: 69 IN | OXYGEN SATURATION: 97 % | WEIGHT: 267 LBS | HEART RATE: 78 BPM | DIASTOLIC BLOOD PRESSURE: 72 MMHG | RESPIRATION RATE: 20 BRPM

## 2022-11-11 DIAGNOSIS — J02.9 PHARYNGITIS, UNSPECIFIED ETIOLOGY: ICD-10-CM

## 2022-11-11 LAB
INT CON NEG: NEGATIVE
INT CON POS: POSITIVE
S PYO AG THROAT QL: NEGATIVE

## 2022-11-11 PROCEDURE — 87880 STREP A ASSAY W/OPTIC: CPT | Performed by: PHYSICIAN ASSISTANT

## 2022-11-11 PROCEDURE — 87070 CULTURE OTHR SPECIMN AEROBIC: CPT

## 2022-11-11 PROCEDURE — 99213 OFFICE O/P EST LOW 20 MIN: CPT | Performed by: PHYSICIAN ASSISTANT

## 2022-11-11 ASSESSMENT — ENCOUNTER SYMPTOMS
EYE PAIN: 0
HEADACHES: 0
SPUTUM PRODUCTION: 0
FEVER: 0
CHILLS: 0
SINUS PAIN: 0
MYALGIAS: 0
VOMITING: 0
COUGH: 1
DIARRHEA: 0
NAUSEA: 0
SHORTNESS OF BREATH: 0
ABDOMINAL PAIN: 0
SORE THROAT: 1
BLURRED VISION: 0
PALPITATIONS: 0
DIZZINESS: 0

## 2022-11-11 ASSESSMENT — FIBROSIS 4 INDEX: FIB4 SCORE: 0.56

## 2022-11-11 NOTE — PROGRESS NOTES
Subjective     Aleyda Verma is a 37 y.o. female who presents with Pharyngitis    HPI:  Aleyda Verma is a 37 y.o. female who presents today for evaluation of sore throat.  Patient reports that for the past 3 to 4 days she has had sore throat and ear pain.  Pain has been worse on the right side.  She has had mild intermittent cough.  No significant nasal congestion or rhinorrhea.  She has not had any fever/chills, chest pain, shortness of breath or body aches.  She has tried taking Tylenol with minimal relief.  No sick contacts.  Patient has done a rapid test for COVID-19 virus at home.  It was negative.      Review of Systems   Constitutional:  Positive for malaise/fatigue. Negative for chills and fever.   HENT:  Positive for ear pain and sore throat. Negative for congestion and sinus pain.    Eyes:  Negative for blurred vision and pain.   Respiratory:  Positive for cough. Negative for sputum production and shortness of breath.    Cardiovascular:  Negative for chest pain and palpitations.   Gastrointestinal:  Negative for abdominal pain, diarrhea, nausea and vomiting.   Musculoskeletal:  Negative for myalgias.   Skin:  Negative for rash.   Neurological:  Negative for dizziness and headaches.       PMH:  has a past medical history of Asthma and Migraine without aura (9/28/2011).    She has no past medical history of Addisons disease ([facility]), Adrenal disorder ([facility]), Allergy, Anemia, Anxiety, Arrhythmia, Arthritis, Blood transfusion without reported diagnosis, Cancer ([facility]), Cataract, CHF (congestive heart failure) ([facility]), Clotting disorder ([facility]), COPD (chronic obstructive pulmonary disease) ([facility]), Cushings syndrome ([facility]), Depression, Diabetes ([facility]), Diabetic neuropathy ([facility]), GERD (gastroesophageal reflux disease), Glaucoma, Goiter, Head ache, Heart attack ([facility]), Heart murmur, HIV (human immunodeficiency virus infection) ([facility]), Hyperlipidemia, Hypertension, IBD (inflammatory bowel disease), Kidney disease,  Meningitis, Muscle disorder, Osteoporosis, Parathyroid disorder (HCC), Pituitary disease (HCC), Pulmonary emphysema (HCC), Seizure (HCC), Sickle cell disease (HCC), Stroke (HCC), Substance abuse (HCC), Thyroid disease, Tuberculosis, or Urinary tract infection.  MEDS:   Current Outpatient Medications:     acetaminophen (TYLENOL) 500 MG Tab, Take 2 Tablets by mouth every 8 hours., Disp: 30 Tablet, Rfl: 0    polymixin-trimethoprim (POLYTRIM) 91248-3.1 UNIT/ML-% Solution, Administer 1 Drop into both eyes every 4 hours. (Patient not taking: Reported on 2022), Disp: 10 mL, Rfl: 0    Prenatal Vit-Fe Fumarate-FA (PRENATAL 1+1 PO), Take  by mouth. (Patient not taking: Reported on 2022), Disp: , Rfl:   ALLERGIES:   Allergies   Allergen Reactions    Sulfa Drugs      Other reaction(s): GI Bleeding   Surgery Date : 2014    Dr Moreau -DIPESH Mujica   Contraindicated after Steven en Y Gastric Bypass due to high risk ulceration.   If ASA used for SECONDARY prevention then please cover with protonix twice a day .      Ibuprofen Unspecified     Pt hx of gastric bypass surgery    K-Y Jelly [Lubricating Jelly] Rash     PT reports having a reaction after last few visits post cervical exams. Unsure if latex allergy or allergy to gel.    Latex Rash     Pt reports having a reaction after last few visits after cervical exams. Unsure if latex allergy or allergy to gel       SURGHX:   Past Surgical History:   Procedure Laterality Date    REPEAT C SECTOIN WITH SALPINGECTOMY Bilateral 10/13/2021    Procedure:  SECTION, REPEAT, WITH SALPINGECTOMY;  Surgeon: Giovana Garcia D.O.;  Location: [facility];  Service: Labor and Delivery    OTHER ABDOMINAL SURGERY  2020    laproscopic ulcer    ABDOMINOPLASTY      GASTRIC BYPASS LAPAROSCOPIC      MAMMOPLASTY AUGMENTATION      PRIMARY C SECTION      c/s x2    REPAIR PERFORATED ULCER       SOCHX:  reports that she has never smoked. She has never used smokeless  "tobacco. She reports that she does not currently use alcohol. She reports that she does not use drugs.  FH: Family history was reviewed, no pertinent findings to report        Objective     /72 (BP Location: Left arm, Patient Position: Sitting, BP Cuff Size: Large adult)   Pulse 78   Temp 36.6 °C (97.8 °F) (Temporal)   Resp 20   Ht 1.74 m (5' 8.5\")   Wt 121 kg (267 lb)   LMP 11/01/2022   SpO2 97%   Breastfeeding No   BMI 40.01 kg/m²      Physical Exam  Constitutional:       Appearance: She is well-developed.   HENT:      Head: Normocephalic and atraumatic.      Right Ear: Tympanic membrane, ear canal and external ear normal.      Left Ear: Tympanic membrane, ear canal and external ear normal.      Nose: Mucosal edema present. No congestion or rhinorrhea.      Mouth/Throat:      Lips: Pink.      Mouth: Mucous membranes are moist.      Pharynx: Uvula midline. Posterior oropharyngeal erythema present. No uvula swelling.      Tonsils: Tonsillar exudate (on right tonsil with probable tonsil stone noted as well.) present. 1+ on the right. 1+ on the left.   Eyes:      Conjunctiva/sclera: Conjunctivae normal.      Pupils: Pupils are equal, round, and reactive to light.   Cardiovascular:      Rate and Rhythm: Normal rate and regular rhythm.      Heart sounds: Normal heart sounds. No murmur heard.  Pulmonary:      Effort: Pulmonary effort is normal.      Breath sounds: Normal breath sounds. No wheezing.   Musculoskeletal:      Cervical back: Normal range of motion.   Lymphadenopathy:      Cervical: Cervical adenopathy present.   Skin:     General: Skin is warm and dry.      Capillary Refill: Capillary refill takes less than 2 seconds.   Neurological:      Mental Status: She is alert and oriented to person, place, and time.   Psychiatric:         Behavior: Behavior normal.         Judgment: Judgment normal.       POCT Rapid Strep A - Negative    Assessment & Plan     1. Pharyngitis, unspecified etiology  - POCT " Rapid Strep A  - CULTURE THROAT; Future  Rapid strep negative.  No evidence of ear infections on exam either.  We will obtain a throat culture to further evaluate.  -Supportive care discussed to include salt water gargles, throat lozenges, and increased fluid intake. OTC acetaminophen to help with pain/discomfort.          Differential Diagnosis, natural history, and supportive care discussed. Return to the Urgent Care or follow up with your PCP if symptoms fail to resolve, or for any new or worsening symptoms. Emergency room precautions discussed. Patient and/or family appears understanding of information.

## 2022-11-13 LAB
BACTERIA SPEC RESP CULT: NORMAL
SIGNIFICANT IND 70042: NORMAL
SITE SITE: NORMAL
SOURCE SOURCE: NORMAL

## 2023-04-17 DIAGNOSIS — O99.019 ANEMIA DURING PREGNANCY: ICD-10-CM

## 2023-04-17 RX ORDER — EPINEPHRINE 1 MG/ML(1)
0.5 AMPUL (ML) INJECTION PRN
OUTPATIENT
Start: 2023-04-17

## 2023-04-17 RX ORDER — SODIUM CHLORIDE 9 MG/ML
INJECTION, SOLUTION INTRAVENOUS CONTINUOUS
OUTPATIENT
Start: 2023-04-17

## 2023-04-17 RX ORDER — DIPHENHYDRAMINE HYDROCHLORIDE 50 MG/ML
50 INJECTION INTRAMUSCULAR; INTRAVENOUS PRN
OUTPATIENT
Start: 2023-04-17

## 2023-04-17 RX ORDER — METHYLPREDNISOLONE SODIUM SUCCINATE 125 MG/2ML
125 INJECTION, POWDER, LYOPHILIZED, FOR SOLUTION INTRAMUSCULAR; INTRAVENOUS PRN
OUTPATIENT
Start: 2023-04-17

## 2023-07-14 ENCOUNTER — HOSPITAL ENCOUNTER (EMERGENCY)
Facility: MEDICAL CENTER | Age: 38
End: 2023-07-14
Attending: EMERGENCY MEDICINE
Payer: COMMERCIAL

## 2023-07-14 VITALS
HEART RATE: 89 BPM | HEIGHT: 68 IN | DIASTOLIC BLOOD PRESSURE: 68 MMHG | TEMPERATURE: 98.3 F | SYSTOLIC BLOOD PRESSURE: 108 MMHG | RESPIRATION RATE: 17 BRPM | OXYGEN SATURATION: 97 % | WEIGHT: 257.72 LBS | BODY MASS INDEX: 39.06 KG/M2

## 2023-07-14 DIAGNOSIS — T14.8XXA HEMATOMA: ICD-10-CM

## 2023-07-14 PROCEDURE — 99282 EMERGENCY DEPT VISIT SF MDM: CPT

## 2023-07-14 ASSESSMENT — FIBROSIS 4 INDEX: FIB4 SCORE: 0.58
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
## 2023-07-15 NOTE — ED NOTES
PT discussed DC with ERP. No further questions at this time. PT ambulated to lobby with steady gait and all belongings.
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
## 2023-07-29 ENCOUNTER — HOSPITAL ENCOUNTER (EMERGENCY)
Facility: MEDICAL CENTER | Age: 38
End: 2023-07-29
Attending: OBSTETRICS & GYNECOLOGY | Admitting: OBSTETRICS & GYNECOLOGY
Payer: COMMERCIAL

## 2023-07-29 VITALS
HEIGHT: 68 IN | TEMPERATURE: 97.4 F | DIASTOLIC BLOOD PRESSURE: 72 MMHG | SYSTOLIC BLOOD PRESSURE: 120 MMHG | WEIGHT: 256 LBS | OXYGEN SATURATION: 97 % | HEART RATE: 81 BPM | BODY MASS INDEX: 38.8 KG/M2

## 2023-07-29 LAB
APPEARANCE UR: CLEAR
COLOR UR AUTO: YELLOW
GLUCOSE BLD STRIP.AUTO-MCNC: 78 MG/DL (ref 65–99)
GLUCOSE UR QL STRIP.AUTO: NEGATIVE MG/DL
KETONES UR QL STRIP.AUTO: ABNORMAL MG/DL
LEUKOCYTE ESTERASE UR QL STRIP.AUTO: NEGATIVE
NITRITE UR QL STRIP.AUTO: NEGATIVE
PH UR STRIP.AUTO: 5 [PH] (ref 5–8)
PROT UR QL STRIP: 30 MG/DL
RBC UR QL AUTO: NEGATIVE
SP GR UR STRIP.AUTO: >=1.03 (ref 1–1.03)

## 2023-07-29 PROCEDURE — 81002 URINALYSIS NONAUTO W/O SCOPE: CPT

## 2023-07-29 PROCEDURE — 59025 FETAL NON-STRESS TEST: CPT

## 2023-07-29 PROCEDURE — 99282 EMERGENCY DEPT VISIT SF MDM: CPT

## 2023-07-29 PROCEDURE — 82962 GLUCOSE BLOOD TEST: CPT

## 2023-07-29 PROCEDURE — 700105 HCHG RX REV CODE 258: Performed by: OBSTETRICS & GYNECOLOGY

## 2023-07-29 RX ORDER — ONDANSETRON 2 MG/ML
4 INJECTION INTRAMUSCULAR; INTRAVENOUS EVERY 4 HOURS PRN
Status: DISCONTINUED | OUTPATIENT
Start: 2023-07-29 | End: 2023-07-29 | Stop reason: HOSPADM

## 2023-07-29 RX ORDER — DEXTROSE, SODIUM CHLORIDE, SODIUM LACTATE, POTASSIUM CHLORIDE, AND CALCIUM CHLORIDE 5; .6; .31; .03; .02 G/100ML; G/100ML; G/100ML; G/100ML; G/100ML
INJECTION, SOLUTION INTRAVENOUS ONCE
Status: COMPLETED | OUTPATIENT
Start: 2023-07-29 | End: 2023-07-29

## 2023-07-29 RX ADMIN — SODIUM CHLORIDE, SODIUM LACTATE, POTASSIUM CHLORIDE, CALCIUM CHLORIDE AND DEXTROSE MONOHYDRATE: 5; 600; 310; 30; 20 INJECTION, SOLUTION INTRAVENOUS at 14:27

## 2023-07-29 ASSESSMENT — FIBROSIS 4 INDEX: FIB4 SCORE: 0.58

## 2023-07-29 NOTE — PROGRESS NOTES
1301-  at 28-6 presents from work as a  for evaluation with the complaint of suddenly feeling weak, shaky, like her ears were underwater, feeling hot and then cold. Pt's client reported she looked pale.Pt has hx of: Steven en Y, CHTN (which she denies), and C/S x 3 (for FTP). Denies ctx, leaking, and bleeding. Reports +FM. Reports nausea and occasional diarrhea which is status quo for her. Reports she has had some water and Gatorade today and only a couple bits of food. States she doesn't eat much and hasn't gained weight the pregnancy due to nausea and not wanting to vomit. Reports she gets some relief from Unisom but nothing else works. States she's been monitoring her blood sugars and they've been fine. Vitals and POC urine as per flowsheets/results review. Uterine activity reported to pt. Pt then endorsed some feelings of pressure.   1348- Call to Dr. Griffith. MD just finishing in OR. Will return call shortly. FOB Omar to cafeteria to get lunch for patient.   1402- Call to Dr. Griffith for IV order due to increasing ctx. MD out of OR. Report given. Orders received for FS, IVF, encourage po.   1405- Report to LEATHA Carlisle.   1435- Care resumed. IVF infusing. Pt eating. MD has seen patient.   1500- Warm blankets provided. Pt tolerated po. Uterine activity   1612- Feeling overall better but reports cramping. Up to bathroom to empty bladder.   1740- Uterine activity resolved. Pt nauseous. Declines Zofran as it's ineffective. Denies need for cervical exam and is comfortable with discharge if MD recommends. Call to Dr. Griffith with report. Discharge orders received.   1820- Instructions reviewed including PTL and FM precautions. See AVS for details. Has appt scheduled Wed. Escorted in w/c to waiting FOB.

## 2023-07-30 NOTE — DISCHARGE INSTRUCTIONS
Pre-term Labor (<37 weeks):  Call your physician or return to the hospital if:  You have painless regular contractions more than 4 in one hour.  Your water breaks (remember time and color).  You have menstrual-like cramps, a low dull backache or pressure in your pelvis or back.  Your baby does not move enough to complete the daily kick count (10 movements in 2 hours).  Your baby moves much less often than on the days before or you have not felt your baby move all day.  Please review the MEDICATION LIST section of your AFTER VISIT SUMMARY document.  Take your medication as prescribed    Keep follow up appointments.   Monitor Baby's movement throughout the day.   Return to hospital for signs of  labor, if baby isn't moving well, return of symptoms, or other concerns for self or baby.    Keep hydrated and try to increase oral food and fluids as able. Discuss this and weight with MD at next appointment if concerns persist.

## 2023-07-30 NOTE — ED PROVIDER NOTES
"Obstetrics and Gynecology  Obstetric Emergency Department Assessment Note    ID: 38 y.o. is a  with IUP at 28w6d    Primary Obstetrician: Tess Desai M.D.    Assessing Obstetrician: Nicole Oliva MD    S: Pt presents to L&D triage with complaint of not feeling well. She was at work doing hair and started to feel weak, shaky, dizzy, hot and cold. She has not eaten barely anything today and has drank a little gatorade and water. She states she is nauseated all through the pregnancy and every pregnancy. She has a hx of gastric bypass so does not eat much. She has not gained any weight this pregnancy. She feels some cramping. No CTX, VB or LOF. Baby moving well.    ROS: 10 systems negative except as noted above.    O: /72   Pulse 81   Temp 36.3 °C (97.4 °F) (Temporal)   Ht 1.727 m (5' 8\")   Wt 116 kg (256 lb)   LMP 2022   SpO2 97%   BMI 38.92 kg/m²    Gen: NAD, AAO  HEENT: NC/AT, MMM  Neck: supple  Abd: Gravid, soft, uterus NTTP, no rebound/guarding  Ext: WWP, 2+ DPP, no edema  Skin: no rash  Neuro: no gross deficits, EOMI  Psy: mood good, affect normal and congruent  Pelvic: SVE deferred    Accu check 78     Latest Reference Range & Units 23 13:36   POC Color  Yellow   POC Appearance  Clear   POC Specific Gravity 1.005 - 1.030  >=1.030 !   POC Urine PH 5.0 - 8.0  5.0   POC Glucose Negative mg/dL Negative   POC Ketones Negative mg/dL Trace !   POC Protein Negative mg/dL 30 !   POC Nitrites Negative  Negative   POC Leukocyte Esterase Negative  Negative   POC Blood Negative  Negative   !: Data is abnormal    NST Report Review:  NST: 140s, positive accels, negative decels, moderate variability, reactive, category 1  Sebring: irregular ctx w/ irritability -> resolved w/ IVF    Assessment:   Aleyda Verma is a 38 y.o.  at 28w6d  Weakness and low blood sugar related to poor calorie intake - improved with IV fluids (D5 LR) and eating, tolerating PO.  Reassuring, reactive " NST.    Plan:  Pt discharged to home  Regular small frequent meals advised, proper nutrition educated  FKC/labor precautions educated  Drink at least 2L of water daily  F/u with Dr. Desai as scheduled  Return here prn concerns.      Nicole Oliva M.D., 7/29/2023, 6:10 PM

## 2023-08-30 ENCOUNTER — HOSPITAL ENCOUNTER (EMERGENCY)
Facility: MEDICAL CENTER | Age: 38
End: 2023-08-30
Attending: OBSTETRICS & GYNECOLOGY | Admitting: OBSTETRICS & GYNECOLOGY
Payer: COMMERCIAL

## 2023-08-30 VITALS
OXYGEN SATURATION: 100 % | SYSTOLIC BLOOD PRESSURE: 113 MMHG | DIASTOLIC BLOOD PRESSURE: 58 MMHG | RESPIRATION RATE: 16 BRPM | HEART RATE: 85 BPM

## 2023-08-30 LAB
APPEARANCE UR: ABNORMAL
COLOR UR AUTO: ABNORMAL
GLUCOSE BLD STRIP.AUTO-MCNC: 72 MG/DL (ref 65–99)
GLUCOSE UR QL STRIP.AUTO: NEGATIVE MG/DL
KETONES UR QL STRIP.AUTO: NEGATIVE MG/DL
LEUKOCYTE ESTERASE UR QL STRIP.AUTO: ABNORMAL
NITRITE UR QL STRIP.AUTO: NEGATIVE
PH UR STRIP.AUTO: 7 [PH] (ref 5–8)
PROT UR QL STRIP: 30 MG/DL
RBC UR QL AUTO: NEGATIVE
SP GR UR STRIP.AUTO: 1.01 (ref 1–1.03)

## 2023-08-30 PROCEDURE — 82962 GLUCOSE BLOOD TEST: CPT

## 2023-08-30 PROCEDURE — 81002 URINALYSIS NONAUTO W/O SCOPE: CPT

## 2023-08-30 PROCEDURE — 59025 FETAL NON-STRESS TEST: CPT

## 2023-08-30 PROCEDURE — 302449 STATCHG TRIAGE ONLY (STATISTIC)

## 2023-08-30 ASSESSMENT — PAIN SCALES - GENERAL: PAINLEVEL: 0 - NO PAIN

## 2023-08-30 NOTE — PROGRESS NOTES
Pt presents to L&D with complaints of an episode of syncope. Pt transferred to [facility] for assessment.     0954 TOCO and EFM applied, VSS. RN will take additional BP for better assessment. Pt reports +FM, denies LOF or VB. Pt states she has a history of low BS and low BP. 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 Pt was with a client when she started to feel sweaty, got dizzy, lost her hearing and vision. Pts client sat her down in the chair and states that she leaned into her arm and appeared to pass out for a few seconds before coming to. Pt did not fall or have any impact to her abdomen. Pt does report some nausea this morning and wasn't able to eat but did eat dinner last night. See UA and FSBS.     1030 Dr. Ravi updated on pt status, okay for pt to eat and come off monitor. If pt reports improvement after eating okay to discharge home.     1047 RN at bedside, pt updated, TOCO and EFM removed. FOB currently getting pt food. RN provided pt with some apple juice and updated on POC.     1125 Pt eating, RN will check back in with pt shortly.     1150 RN at bedside, pt feeling better but still a little tired. Pt feels comfortable going home. Pt educated on eating 5-6 small meals through out the day to help maintain her blood sugars and to increase her water intake to help with dehydration. All questions answered.     1155 Pt discharged home in stable condition. Pt awaiting someone to escort her via wheelchair to meet FOB at [facility]

## 2023-09-05 ENCOUNTER — APPOINTMENT (OUTPATIENT)
Dept: ADMISSIONS | Facility: MEDICAL CENTER | Age: 38
End: 2023-09-05
Attending: OBSTETRICS & GYNECOLOGY
Payer: COMMERCIAL

## 2023-09-09 ENCOUNTER — HOSPITAL ENCOUNTER (OUTPATIENT)
Facility: MEDICAL CENTER | Age: 38
End: 2023-09-10
Attending: OBSTETRICS & GYNECOLOGY | Admitting: OBSTETRICS & GYNECOLOGY
Payer: COMMERCIAL

## 2023-09-09 PROBLEM — O47.00 PRETERM UTERINE CONTRACTIONS: Status: ACTIVE | Noted: 2023-09-09

## 2023-09-09 LAB
ABO GROUP BLD: NORMAL
BASOPHILS # BLD AUTO: 0.1 % (ref 0–1.8)
BASOPHILS # BLD: 0.01 K/UL (ref 0–0.12)
BLD GP AB SCN SERPL QL: NORMAL
EOSINOPHIL # BLD AUTO: 0.06 K/UL (ref 0–0.51)
EOSINOPHIL NFR BLD: 0.6 % (ref 0–6.9)
ERYTHROCYTE [DISTWIDTH] IN BLOOD BY AUTOMATED COUNT: 41.9 FL (ref 35.9–50)
HCT VFR BLD AUTO: 31.3 % (ref 37–47)
HGB BLD-MCNC: 10 G/DL (ref 12–16)
IMM GRANULOCYTES # BLD AUTO: 0.04 K/UL (ref 0–0.11)
IMM GRANULOCYTES NFR BLD AUTO: 0.4 % (ref 0–0.9)
LYMPHOCYTES # BLD AUTO: 1.49 K/UL (ref 1–4.8)
LYMPHOCYTES NFR BLD: 15.5 % (ref 22–41)
MCH RBC QN AUTO: 24.3 PG (ref 27–33)
MCHC RBC AUTO-ENTMCNC: 31.9 G/DL (ref 32.2–35.5)
MCV RBC AUTO: 76.2 FL (ref 81.4–97.8)
MONOCYTES # BLD AUTO: 0.4 K/UL (ref 0–0.85)
MONOCYTES NFR BLD AUTO: 4.2 % (ref 0–13.4)
NEUTROPHILS # BLD AUTO: 7.63 K/UL (ref 1.82–7.42)
NEUTROPHILS NFR BLD: 79.2 % (ref 44–72)
NRBC # BLD AUTO: 0 K/UL
NRBC BLD-RTO: 0 /100 WBC (ref 0–0.2)
PLATELET # BLD AUTO: 320 K/UL (ref 164–446)
PMV BLD AUTO: 10.7 FL (ref 9–12.9)
RBC # BLD AUTO: 4.11 M/UL (ref 4.2–5.4)
RH BLD: NORMAL
T PALLIDUM AB SER QL IA: NORMAL
WBC # BLD AUTO: 9.6 K/UL (ref 4.8–10.8)

## 2023-09-09 PROCEDURE — 700111 HCHG RX REV CODE 636 W/ 250 OVERRIDE (IP): Mod: JZ | Performed by: OBSTETRICS & GYNECOLOGY

## 2023-09-09 PROCEDURE — 86780 TREPONEMA PALLIDUM: CPT

## 2023-09-09 PROCEDURE — 85025 COMPLETE CBC W/AUTO DIFF WBC: CPT

## 2023-09-09 PROCEDURE — 86900 BLOOD TYPING SEROLOGIC ABO: CPT

## 2023-09-09 PROCEDURE — G0378 HOSPITAL OBSERVATION PER HR: HCPCS

## 2023-09-09 PROCEDURE — 36415 COLL VENOUS BLD VENIPUNCTURE: CPT

## 2023-09-09 PROCEDURE — 87150 DNA/RNA AMPLIFIED PROBE: CPT

## 2023-09-09 PROCEDURE — 86901 BLOOD TYPING SEROLOGIC RH(D): CPT

## 2023-09-09 PROCEDURE — 86850 RBC ANTIBODY SCREEN: CPT

## 2023-09-09 PROCEDURE — 87081 CULTURE SCREEN ONLY: CPT

## 2023-09-09 PROCEDURE — 87653 STREP B DNA AMP PROBE: CPT

## 2023-09-09 PROCEDURE — 96374 THER/PROPH/DIAG INJ IV PUSH: CPT

## 2023-09-09 PROCEDURE — 59025 FETAL NON-STRESS TEST: CPT

## 2023-09-09 RX ORDER — ONDANSETRON 2 MG/ML
4 INJECTION INTRAMUSCULAR; INTRAVENOUS EVERY 6 HOURS PRN
Status: DISCONTINUED | OUTPATIENT
Start: 2023-09-09 | End: 2023-09-10 | Stop reason: HOSPADM

## 2023-09-09 RX ADMIN — ONDANSETRON 4 MG: 2 INJECTION INTRAMUSCULAR; INTRAVENOUS at 23:42

## 2023-09-10 VITALS
WEIGHT: 255 LBS | DIASTOLIC BLOOD PRESSURE: 66 MMHG | SYSTOLIC BLOOD PRESSURE: 115 MMHG | BODY MASS INDEX: 38.65 KG/M2 | HEIGHT: 68 IN | RESPIRATION RATE: 16 BRPM | TEMPERATURE: 97.3 F | HEART RATE: 81 BPM

## 2023-09-10 LAB
ABO + RH BLD: NORMAL
GP B STREP DNA SPEC QL NAA+PROBE: NEGATIVE

## 2023-09-10 PROCEDURE — 700102 HCHG RX REV CODE 250 W/ 637 OVERRIDE(OP): Performed by: OBSTETRICS & GYNECOLOGY

## 2023-09-10 PROCEDURE — G0378 HOSPITAL OBSERVATION PER HR: HCPCS

## 2023-09-10 PROCEDURE — A9270 NON-COVERED ITEM OR SERVICE: HCPCS | Performed by: OBSTETRICS & GYNECOLOGY

## 2023-09-10 PROCEDURE — 700111 HCHG RX REV CODE 636 W/ 250 OVERRIDE (IP): Performed by: OBSTETRICS & GYNECOLOGY

## 2023-09-10 PROCEDURE — 36415 COLL VENOUS BLD VENIPUNCTURE: CPT

## 2023-09-10 PROCEDURE — 96375 TX/PRO/DX INJ NEW DRUG ADDON: CPT

## 2023-09-10 RX ORDER — ACETAMINOPHEN 500 MG
1000 TABLET ORAL EVERY 6 HOURS PRN
Status: DISCONTINUED | OUTPATIENT
Start: 2023-09-10 | End: 2023-09-10 | Stop reason: HOSPADM

## 2023-09-10 RX ORDER — DIPHENHYDRAMINE HYDROCHLORIDE 50 MG/ML
25 INJECTION INTRAMUSCULAR; INTRAVENOUS ONCE
Status: COMPLETED | OUTPATIENT
Start: 2023-09-10 | End: 2023-09-10

## 2023-09-10 RX ADMIN — DIPHENHYDRAMINE HYDROCHLORIDE 25 MG: 50 INJECTION, SOLUTION INTRAMUSCULAR; INTRAVENOUS at 04:15

## 2023-09-10 RX ADMIN — ACETAMINOPHEN 1000 MG: 500 TABLET ORAL at 04:15

## 2023-09-10 ASSESSMENT — PATIENT HEALTH QUESTIONNAIRE - PHQ9
SUM OF ALL RESPONSES TO PHQ9 QUESTIONS 1 AND 2: 0
1. LITTLE INTEREST OR PLEASURE IN DOING THINGS: NOT AT ALL
2. FEELING DOWN, DEPRESSED, IRRITABLE, OR HOPELESS: NOT AT ALL

## 2023-09-10 NOTE — CARE PLAN
The patient is Stable - Low risk of patient condition declining or worsening    Shift Goals  Clinical Goals: Overnight labor observation; decrease contractions  Patient Goals: Rest  Family Goals: Not present      Problem: Psychosocial - L&D  Goal: Spiritual and cultural needs incorporated into hospitalization  Outcome: Progressing     Problem: Risk for Infection and Impaired Wound Healing  Goal: Patient will remain free from infection  Outcome: Progressing     Problem: Risk for Injury  Goal: Patient and fetus will be free of preventable injury/complications  Outcome: Progressing

## 2023-09-10 NOTE — PROGRESS NOTES
Pt came from home with c/o lof for two days.  Pt not sure if her bag of water is broken.  Pt states +fm and denies vb.      1820 Speculum exam performed, no pooling noted in vaginal vault.  Samples collected for ferning and amnisure.      1830 - Amnisure performed.  1840 Amnisure read and was negative      1838 - Dr. Barajas notified of patient arrival and status.  Orders received.      1900 - Report to Teresa ZHANG

## 2023-09-10 NOTE — DISCHARGE SUMMARY
Discharge Summary:     Date of Admission: 2023  Date of Discharge: 09/10/23      Admitting diagnosis:    1. Single intrauterine Pregnancy @ 34w6d  2.   contractions  3.  History of  x3  4.  Chronic hypertension    Discharge diagnosis:  1.  SIUP @ 35w0d  2.  History of  x3  3.  Chronic hypertension    Pmhx:   cHTN (no meds)  Childhood asthma  Obesity    OB History    Para Term  AB Living   5 3 3   1 3   SAB IAB Ectopic Molar Multiple Live Births   1   0   0 3      # Outcome Date GA Lbr Tylor/2nd Weight Sex Delivery Anes PTL Lv   5 Current            4 Term 10/13/21 38w0d  2.45 kg (5 lb 6.4 oz) M CS-LTranv Spinal N MEAGHAN   3 Term 19 38w4d  2.22 kg (4 lb 14.3 oz) F CS-LTranv Spinal  MEAGHAN      Birth Comments: SGA   2 SAB            1 Term 07/04/10 40w0d  3.118 kg (6 lb 14 oz) M CS-LTranv   MEAGHAN      Complications: Cephalopelvic Disproportion     Past Surgical History:   Procedure Laterality Date    REPEAT C SECTOIN WITH SALPINGECTOMY Bilateral 10/13/2021    Procedure:  SECTION, REPEAT, WITH SALPINGECTOMY;  Surgeon: Giovana Garcia D.O.;  Location: [facility];  Service: Labor and Delivery    OTHER ABDOMINAL SURGERY  2020    laproscopic ulcer    ABDOMINOPLASTY      GASTRIC BYPASS LAPAROSCOPIC      MAMMOPLASTY AUGMENTATION      PRIMARY C SECTION      c/s x2    REPAIR PERFORATED ULCER       Allergies: Sulfa drugs, Ibuprofen, K-y jelly [lubricating jelly], and Latex    Hospital Course:   Pt is a 38 y.o.  who presented for possible loss of fluid and contractions.  No evidence of rupture membranes was found however the patient was liz every 2 to 3 minutes painfully.  She did not change her cervix over several hours but continued to have painful contractions and was admitted for observation given her history of  x3.  The patient rested overnight and her contractions diminished.  The patient was more comfortable the next  morning and had a reactive tracing all night.  Given strict  labor precautions with follow-up as scheduled within the coming week in the office with primary OB.      Physical Exam:  Temp:  [36 °C (96.8 °F)-36.3 °C (97.3 °F)] 36.3 °C (97.3 °F)  Pulse:  [79-81] 81  Resp:  [16-17] 16  BP: (115-119)/(59-66) 115/66  Gen: AAO, NAD  Abd: soft, NT, gravid  Ext: NT, trace edema    NST:  FHT: Indication: contractions  130/mod artemio/+accels/no decels  [place]: irritability, occasional ctx    Current Facility-Administered Medications   Medication Dose    acetaminophen (Tylenol) tablet 1,000 mg  1,000 mg    ondansetron (Zofran) syringe/vial injection 4 mg  4 mg       Recent Labs     23   WBC 9.6   RBC 4.11*   HEMOGLOBIN 10.0*   HEMATOCRIT 31.3*   MCV 76.2*   MCH 24.3*   MCHC 31.9*   RDW 41.9   PLATELETCT 320   MPV 10.7         Activity/ Discharge Instructions::   Discharge to home  Activity as tolerated  Call or come to ED for: vaginal bleeding, increasing uterine contractions, loss of fluid, decreased fetal movement, severe worsening abdominal pain, other concerns     Follow up:   as scheduled with Dr. Desai    Discharge Meds:      Medication List        CONTINUE taking these medications        Instructions   Acetaminophen Extra Strength 500 MG Tabs   Take 2 Tablets by mouth every 8 hours.  Dose: 1,000 mg     PRENATAL 1+1 PO   Take  by mouth.            STOP taking these medications      polymixin-trimethoprim 02049-9.1 UNIT/ML-% Soln  Commonly known as: Polytriyonathan Schmid MD

## 2023-09-10 NOTE — PROGRESS NOTES
Late entry due to patient care    1857 -  at bedside to assess pt. US done at bedside    See provider notes for details

## 2023-09-10 NOTE — H&P
OB H&P:    CC: contractions    HPI:  Ms. Aleyda Verma is a 38 y.o.  @ 34w6d by 7wk US with hx of c/s x3 here for loss of fluid.  Pt reports she has over the last few days noticed intermittent leakage of watery fluid.  Never had a large gush, not soaking through clothes.  Also reports now this evening she has contractions every few minutes and painful.  Denies VB (small pinkish reported several days ago, now resolve), reports +FM.        PNC with Dr. Desai.  Pt has history of chronic  hypertension, is not on medication for this.  Has a hx of hanna-en-y procedure, low Vit D which she is taking PO replacement.  Follows with [facility].  Last growth at 32wks, efw 29%.  Anatomy wnl, posterior placenta.  NiPT low risk.      Plan for repeat c/s w/ BS.      PNL:  Rh+/-, RI, HIV neg, RPR NR, HBsAg NR, HCV neg, GC/CT neg/neg  No glucola secondary to hx of hanna-en-y; checked Bgs and all wnl    ROS:  Const: denies fevers, general concerns  CV/resp: reports no concerns  GI: denies abd pain, GI concerns  : see HPI  Neuro: reports no HA/vision changes    OB History    Para Term  AB Living   5 3 3   1 3   SAB IAB Ectopic Molar Multiple Live Births   1   0   0 3      # Outcome Date GA Lbr Tylor/2nd Weight Sex Delivery Anes PTL Lv   5 Current            4 Term 10/13/21 38w0d  2.45 kg (5 lb 6.4 oz) M CS-LTranv Spinal N MEAGHAN   3 Term 19 38w4d  2.22 kg (4 lb 14.3 oz) F CS-LTranv Spinal  MEAGHAN      Birth Comments: SGA   2 SAB 2016           1 Term 07/04/10 40w0d  3.118 kg (6 lb 14 oz) M CS-LTranv   MEAGHAN      Complications: Cephalopelvic Disproportion       GYN: denies STIs, no cervical procedures    Pmhx:   cHTN (no meds)  Childhood asthma  Obesity    Past Surgical History:   Procedure Laterality Date    REPEAT C SECTOIN WITH SALPINGECTOMY Bilateral 10/13/2021    Procedure:  SECTION, REPEAT, WITH SALPINGECTOMY;  Surgeon: Giovana Garcia D.O.;  Location: [facility];  Service: Labor and  "Delivery    OTHER ABDOMINAL SURGERY  2020    laproscopic ulcer    ABDOMINOPLASTY      GASTRIC BYPASS LAPAROSCOPIC      MAMMOPLASTY AUGMENTATION      PRIMARY C SECTION      c/s x2    REPAIR PERFORATED ULCER         Meds: PNV, Vit D    FamHx: HTN, CAD    Social History     Socioeconomic History    Marital status:    Tobacco Use    Smoking status: Never    Smokeless tobacco: Never   Vaping Use    Vaping Use: Never used   Substance and Sexual Activity    Alcohol use: Not Currently    Drug use: Never    Sexual activity: Yes     Partners: Male       PE:  Vitals:    23 1750   Weight: 116 kg (255 lb)   Height: 1.727 m (5' 8\")     gen: AAO, NAD  abd: soft, gravid, NT  TAUS bedside US performed: LARS 9.9    Ext: NT, no edema    SVE: /-3  1hr later: 1/20/-3     NST:   Indication: contractions  FHT: 130/moderate variability/+ accels/ no decels  [place]: q2-3min ctx      A/P: 38 y.o.  @ 34w6d by 7wk US with hx of c/s x3 here with contractions.    Pt liz regularly and painfully.  No real change over several hours in triage however still with regular, painful ctx.  Given hx of c/s x3, recommend observation overnight.  Will admit to antepartum.    Discussed consideration for BMZ.  Discussed at GA 34-37wks babies that received BMZ decreased immediate respiratory interventions, does have increased risk of hypoglycemia, and limited long term data.  Pt declines for now, will let us know if she changes her mind.     NST reactive and reassuring  Will collect GBS    If signs of labor desires repeat c/s with BS    Allie Schmid MD  [facility]        "

## 2023-09-10 NOTE — PROGRESS NOTES
"2245: Report received from Teresa ZHANG. POC discussed, assumed care of pt. Pt transferred from LDA 01 to S231 for overnight observation. EFM/TOCO applied, all questions and concerns answered. Pt states that she still has painful contractions however they have not increased in frequency or strength.    2352:  RN at bedside adjusting monitors. Pt c/o of intense back pain radiating down her pelvis and increased vaginal pressure. Pt rates pain 8/10.     2354: Dr. Barajas notified, orders receivedto recheck cervix. SVE unchanged.    0155: RN at [facility] adjusting monitoring. Difficult monitoring d/t to maternal movement from ctxs. Heat pack brought to pt. Pt offered therapeutic rest, but declines at the moment. Will call RN when needed.    0241: Pt called RN c/o of 8-9/10 pain on her RLQ. Tender to palpation. Pt states pain feel's like a constant \"twitch inside\". Pt states that this pain is new and is giving her anxiety. Pt assured of Category I tracing. Dr. Barajas notified, MD is currently in a delivery.    0330: Pt states RLQ pain has decreased and that contractions have spaced out.     0355: Update given to Dr. Karl MD will come to bedside to see pt before shift change. Orders received for tylenol and benadryl. Pt agrees with POC.    0540: Dr. Barajas at bedside discussing current status and discharge home.    0600: Patient discharged home with specific instruction to return to L&D/Physician ie.. Bleeding/ROM/decreased FM/labor/concerns for self or baby.  Patient denies questions or concerns regarding POC since arrival and POC to discharge home.  Patient escorted by wheelchair out of hospital by KIKO Krueger.               "

## 2023-09-11 ENCOUNTER — HOSPITAL ENCOUNTER (EMERGENCY)
Facility: MEDICAL CENTER | Age: 38
End: 2023-09-11
Attending: OBSTETRICS & GYNECOLOGY | Admitting: OBSTETRICS & GYNECOLOGY
Payer: COMMERCIAL

## 2023-09-11 ENCOUNTER — APPOINTMENT (OUTPATIENT)
Dept: RADIOLOGY | Facility: MEDICAL CENTER | Age: 38
End: 2023-09-11
Attending: OBSTETRICS & GYNECOLOGY
Payer: COMMERCIAL

## 2023-09-11 VITALS
WEIGHT: 255 LBS | TEMPERATURE: 97.6 F | HEART RATE: 80 BPM | SYSTOLIC BLOOD PRESSURE: 122 MMHG | DIASTOLIC BLOOD PRESSURE: 62 MMHG | BODY MASS INDEX: 38.65 KG/M2 | HEIGHT: 68 IN

## 2023-09-11 LAB
ALBUMIN SERPL BCP-MCNC: 3.8 G/DL (ref 3.2–4.9)
ALBUMIN/GLOB SERPL: 0.9 G/DL
ALP SERPL-CCNC: 109 U/L (ref 30–99)
ALT SERPL-CCNC: 9 U/L (ref 2–50)
ANION GAP SERPL CALC-SCNC: 13 MMOL/L (ref 7–16)
APPEARANCE UR: ABNORMAL
AST SERPL-CCNC: 13 U/L (ref 12–45)
BILIRUB SERPL-MCNC: 0.4 MG/DL (ref 0.1–1.5)
BUN SERPL-MCNC: 7 MG/DL (ref 8–22)
CALCIUM ALBUM COR SERPL-MCNC: 9.4 MG/DL (ref 8.5–10.5)
CALCIUM SERPL-MCNC: 9.2 MG/DL (ref 8.5–10.5)
CHLORIDE SERPL-SCNC: 102 MMOL/L (ref 96–112)
CO2 SERPL-SCNC: 19 MMOL/L (ref 20–33)
COLOR UR AUTO: YELLOW
CREAT SERPL-MCNC: 0.61 MG/DL (ref 0.5–1.4)
ERYTHROCYTE [DISTWIDTH] IN BLOOD BY AUTOMATED COUNT: 44 FL (ref 35.9–50)
GFR SERPLBLD CREATININE-BSD FMLA CKD-EPI: 118 ML/MIN/1.73 M 2
GLOBULIN SER CALC-MCNC: 4.1 G/DL (ref 1.9–3.5)
GLUCOSE SERPL-MCNC: 72 MG/DL (ref 65–99)
GLUCOSE UR QL STRIP.AUTO: NEGATIVE MG/DL
GP B STREP DNA SPEC QL NAA+PROBE: NEGATIVE
HCT VFR BLD AUTO: 35.9 % (ref 37–47)
HGB BLD-MCNC: 10.9 G/DL (ref 12–16)
HOLDING TUBE BB 8507: NORMAL
KETONES UR QL STRIP.AUTO: NEGATIVE MG/DL
LEUKOCYTE ESTERASE UR QL STRIP.AUTO: ABNORMAL
MCH RBC QN AUTO: 23.7 PG (ref 27–33)
MCHC RBC AUTO-ENTMCNC: 30.4 G/DL (ref 32.2–35.5)
MCV RBC AUTO: 78.2 FL (ref 81.4–97.8)
NITRITE UR QL STRIP.AUTO: NEGATIVE
PH UR STRIP.AUTO: 6 [PH] (ref 5–8)
PLATELET # BLD AUTO: 411 K/UL (ref 164–446)
PMV BLD AUTO: 10.9 FL (ref 9–12.9)
POTASSIUM SERPL-SCNC: 3.8 MMOL/L (ref 3.6–5.5)
PROT SERPL-MCNC: 7.9 G/DL (ref 6–8.2)
PROT UR QL STRIP: ABNORMAL MG/DL
RBC # BLD AUTO: 4.59 M/UL (ref 4.2–5.4)
RBC UR QL AUTO: ABNORMAL
SODIUM SERPL-SCNC: 134 MMOL/L (ref 135–145)
SP GR UR STRIP.AUTO: 1.02 (ref 1–1.03)
T PALLIDUM AB SER QL IA: NORMAL
WBC # BLD AUTO: 9.5 K/UL (ref 4.8–10.8)

## 2023-09-11 PROCEDURE — 80053 COMPREHEN METABOLIC PANEL: CPT

## 2023-09-11 PROCEDURE — 81002 URINALYSIS NONAUTO W/O SCOPE: CPT

## 2023-09-11 PROCEDURE — 96374 THER/PROPH/DIAG INJ IV PUSH: CPT

## 2023-09-11 PROCEDURE — 59025 FETAL NON-STRESS TEST: CPT

## 2023-09-11 PROCEDURE — 99284 EMERGENCY DEPT VISIT MOD MDM: CPT

## 2023-09-11 PROCEDURE — 85027 COMPLETE CBC AUTOMATED: CPT

## 2023-09-11 PROCEDURE — A9270 NON-COVERED ITEM OR SERVICE: HCPCS | Performed by: OBSTETRICS & GYNECOLOGY

## 2023-09-11 PROCEDURE — 76815 OB US LIMITED FETUS(S): CPT

## 2023-09-11 PROCEDURE — 36415 COLL VENOUS BLD VENIPUNCTURE: CPT

## 2023-09-11 PROCEDURE — 86780 TREPONEMA PALLIDUM: CPT

## 2023-09-11 PROCEDURE — 700105 HCHG RX REV CODE 258: Performed by: OBSTETRICS & GYNECOLOGY

## 2023-09-11 PROCEDURE — 700111 HCHG RX REV CODE 636 W/ 250 OVERRIDE (IP): Performed by: OBSTETRICS & GYNECOLOGY

## 2023-09-11 PROCEDURE — 700102 HCHG RX REV CODE 250 W/ 637 OVERRIDE(OP): Performed by: OBSTETRICS & GYNECOLOGY

## 2023-09-11 RX ORDER — MORPHINE SULFATE 4 MG/ML
4 INJECTION INTRAVENOUS ONCE
Status: COMPLETED | OUTPATIENT
Start: 2023-09-11 | End: 2023-09-11

## 2023-09-11 RX ORDER — SODIUM CHLORIDE, SODIUM LACTATE, POTASSIUM CHLORIDE, AND CALCIUM CHLORIDE .6; .31; .03; .02 G/100ML; G/100ML; G/100ML; G/100ML
2000 INJECTION, SOLUTION INTRAVENOUS ONCE
Status: COMPLETED | OUTPATIENT
Start: 2023-09-11 | End: 2023-09-11

## 2023-09-11 RX ORDER — NIFEDIPINE 10 MG/1
CAPSULE ORAL
Status: DISCONTINUED
Start: 2023-09-11 | End: 2023-09-11 | Stop reason: HOSPADM

## 2023-09-11 RX ORDER — NIFEDIPINE 10 MG/1
20 CAPSULE ORAL EVERY 8 HOURS
Status: DISCONTINUED | OUTPATIENT
Start: 2023-09-11 | End: 2023-09-11 | Stop reason: HOSPADM

## 2023-09-11 RX ORDER — PROCHLORPERAZINE EDISYLATE 5 MG/ML
10 INJECTION INTRAMUSCULAR; INTRAVENOUS ONCE
Status: COMPLETED | OUTPATIENT
Start: 2023-09-11 | End: 2023-09-11

## 2023-09-11 RX ADMIN — SODIUM CHLORIDE, POTASSIUM CHLORIDE, SODIUM LACTATE AND CALCIUM CHLORIDE 2000 ML: 600; 310; 30; 20 INJECTION, SOLUTION INTRAVENOUS at 14:34

## 2023-09-11 RX ADMIN — NIFEDIPINE 20 MG: 10 CAPSULE ORAL at 12:30

## 2023-09-11 RX ADMIN — PROCHLORPERAZINE EDISYLATE 10 MG: 5 INJECTION INTRAMUSCULAR; INTRAVENOUS at 14:34

## 2023-09-11 ASSESSMENT — PAIN DESCRIPTION - PAIN TYPE
TYPE: ACUTE PAIN

## 2023-09-11 ASSESSMENT — PAIN SCALES - GENERAL: PAINLEVEL: 7

## 2023-09-11 NOTE — PROGRESS NOTES
38 y.o.  hx of 3 previous c/s here to LDa2 with FOB Omar c/o vagianl bleeding, contractions and pain at her previous c/s scar rating 7/10. Pt was discharged yesterday morning after 24 hour observation for  contractions. EFM & [place] appplied. 's moderate variability, accels present, no decels. VSS /62 with large red cuff. Report to Dr. Oliva. Order for US to evaluate uterine scar and placental location. IV hydration, & UA.   Pt unable to void.   Pt states she is a very difficult IV stick, and requires US guided PIV placement. Order placed for vascular access, and US.   PIV placed by vascular access team. LR bolus per MD order.   Reactive NST obtained. Nifedipine 20 mg given po.  1700 Repeat SVE no change. Pt reporting decreased contractions since Nifedipine. Requesting to go home. Dr. Oliva at bedside. Discharge order received. IV discontinued. Discharge teaching performed. Discharged to home, ambulatory.

## 2023-09-11 NOTE — ED PROVIDER NOTES
"Obstetrics and Gynecology  Obstetric Emergency Department Assessment Note    ID: 38 y.o. is a  with IUP at 35w1d    Primary Obstetrician: Tess Desai M.D.    Assessing Obstetrician: Nicole Oliva MD    S: Pt presents to triage with complaint of some bright red spotting, contractions, and pain in her abdomen. Onset 5 days ago. States pain 7/10 severity. Sharp. Pain is worse at her right mid quadrant but also states her  scar hurts when she contracts. She states she is just \"done\" and wants to have her baby.  Tried some tylenol but it did not help. She was admitted  to 9/10 for observation due to  contractions and did not change her cervix so was discharged to home. She was told to return if she developed bleeding or symptoms worsening.     ROS: 10 systems negative except as noted above.    O: /62   Pulse 80   Temp 36.4 °C (97.6 °F) (Temporal)   Ht 1.727 m (5' 8\")   Wt 116 kg (255 lb)   LMP 2022   BMI 38.77 kg/m²    Gen: NAD, AAO  HEENT: NC/AT, MMM  Neck: supple  Abd: Gravid, soft, uterus NTTP, no rebound/guarding/spasm,  incision site NON-tender  Ext: WWP, 2+ DPP, no edema  Skin: no rash  Neuro: no gross deficits, EOMI  Psy: mood good, affect normal and congruent  Pelvic: SVE external os 1cm, internal os fingertip and unable to get finger through internal os per RN exam    NST Report Review:  NST: 130s-140s, pos accels, neg decels, moderate variability, reactive NST, cat 1 FHR  [place]: q 2-4 min with irritability      2023 10:05 AM     HISTORY/REASON FOR EXAM:  Vaginal bleeding; Pt has had 3 previous  sections and is having pain at previous surgical site. Pt assess placental location and r/o uterine rupture at scar. Thank you.      TECHNIQUE/EXAM DESCRIPTION: OB limited ultrasound.     COMPARISON:  None     FINDINGS:  Fetal Lie:  Vertex  LMP:  Clinical PRASANTH by LMP:  10/15/2023     Placenta (Location):  Left lateral/anterior  Placenta Previa: " No  Placental rdGrdrrdarddrderd:rd rd3rd Amniotic Fluid Volume:  LARS = 8.89 cm     Fetal Heart Rate:  130 bpm     No maternal adnexal mass is identified.        EGA by this US:  35 weeks  PRASANTH by this US: 10/15/2023     IMPRESSION:     Single intrauterine pregnancy of an estimated gestational age of 35 weeks with an estimated date of delivery of 10/15/2023.     Fetal survey within normal limits.      Assessment:   Aleyda Verma is a 38 y.o.  at 35w1d.   contractions. No current evidence of  labor. No evidence of abruption or  scar dehiscence.   Abdominal pain.  Prior  x 3.  CHTN.  Reassuring, reactive NST.  GBS neg.    Plan:  Triage observation  IVF hydration  20mg Nifedipine PO  Pain meds, nausea meds  Try to stop contractions as she is only 35 weeks  Pt wants to be delivered however informed her that 35 week babies do go to NICU and our goal is to try to prevent delivery is possible.  Re-evaluate after interventions.      Nicole Oliva M.D.      OB MD Addendum note: 23 at 1730     Patient has been observed in triage all day. She received the oral Nifedipine which pt states really helped to decrease her contractions. She declined the morphine for pain control. She is feeling much better and wants to go home.  SVE unchanged per RN  FHR 120s-130s, reactive, category 1  [place] with irregular contractions.  Pt will be discharged to home.  FKC and labor precautions educated.  She will f/u with Dr. Desai as scheduled.  Return prn concerns.      Evaluation and clinical decision making including analysis of fetal data, imaging and maternal lab work completed over a 35 minute period.

## 2023-09-18 ENCOUNTER — PRE-ADMISSION TESTING (OUTPATIENT)
Dept: ADMISSIONS | Facility: MEDICAL CENTER | Age: 38
End: 2023-09-18
Attending: OBSTETRICS & GYNECOLOGY
Payer: COMMERCIAL

## 2023-09-18 RX ORDER — IRON 18 MG
1 TABLET ORAL DAILY
COMMUNITY

## 2023-09-20 ENCOUNTER — APPOINTMENT (OUTPATIENT)
Dept: RADIOLOGY | Facility: MEDICAL CENTER | Age: 38
End: 2023-09-20
Attending: OBSTETRICS & GYNECOLOGY
Payer: COMMERCIAL

## 2023-09-20 ENCOUNTER — ANESTHESIA EVENT (OUTPATIENT)
Dept: OBGYN | Facility: MEDICAL CENTER | Age: 38
End: 2023-09-20
Payer: COMMERCIAL

## 2023-09-20 ENCOUNTER — ANESTHESIA (OUTPATIENT)
Dept: OBGYN | Facility: MEDICAL CENTER | Age: 38
End: 2023-09-20
Payer: COMMERCIAL

## 2023-09-20 ENCOUNTER — HOSPITAL ENCOUNTER (INPATIENT)
Facility: MEDICAL CENTER | Age: 38
LOS: 2 days | End: 2023-09-22
Attending: OBSTETRICS & GYNECOLOGY | Admitting: OBSTETRICS & GYNECOLOGY
Payer: COMMERCIAL

## 2023-09-20 DIAGNOSIS — G89.18 ACUTE POST-OPERATIVE PAIN: ICD-10-CM

## 2023-09-20 PROBLEM — O99.213 OBESITY COMPLICATING PREGNANCY IN THIRD TRIMESTER: Status: RESOLVED | Noted: 2021-09-28 | Resolved: 2023-09-20

## 2023-09-20 PROBLEM — Z98.891 HISTORY OF CESAREAN SECTION: Status: RESOLVED | Noted: 2018-06-19 | Resolved: 2023-09-20

## 2023-09-20 PROBLEM — O60.00 PREMATURE LABOR: Status: RESOLVED | Noted: 2021-09-28 | Resolved: 2023-09-20

## 2023-09-20 PROBLEM — Z98.891 S/P CESAREAN SECTION: Status: ACTIVE | Noted: 2023-09-20

## 2023-09-20 PROBLEM — O09.93 HIGH-RISK PREGNANCY SUPERVISION, THIRD TRIMESTER: Status: RESOLVED | Noted: 2021-09-28 | Resolved: 2023-09-20

## 2023-09-20 LAB
BASOPHILS # BLD AUTO: 0.3 % (ref 0–1.8)
BASOPHILS # BLD: 0.03 K/UL (ref 0–0.12)
CRYSTALS AMN MICRO: NORMAL
EOSINOPHIL # BLD AUTO: 0.02 K/UL (ref 0–0.51)
EOSINOPHIL NFR BLD: 0.2 % (ref 0–6.9)
ERYTHROCYTE [DISTWIDTH] IN BLOOD BY AUTOMATED COUNT: 43.8 FL (ref 35.9–50)
GLUCOSE BLD STRIP.AUTO-MCNC: 92 MG/DL (ref 65–99)
HCT VFR BLD AUTO: 34.4 % (ref 37–47)
HGB BLD-MCNC: 10.7 G/DL (ref 12–16)
HOLDING TUBE BB 8507: NORMAL
IMM GRANULOCYTES # BLD AUTO: 0.03 K/UL (ref 0–0.11)
IMM GRANULOCYTES NFR BLD AUTO: 0.3 % (ref 0–0.9)
LYMPHOCYTES # BLD AUTO: 1.37 K/UL (ref 1–4.8)
LYMPHOCYTES NFR BLD: 14.5 % (ref 22–41)
MCH RBC QN AUTO: 24 PG (ref 27–33)
MCHC RBC AUTO-ENTMCNC: 31.1 G/DL (ref 32.2–35.5)
MCV RBC AUTO: 77.1 FL (ref 81.4–97.8)
MONOCYTES # BLD AUTO: 0.36 K/UL (ref 0–0.85)
MONOCYTES NFR BLD AUTO: 3.8 % (ref 0–13.4)
NEUTROPHILS # BLD AUTO: 7.62 K/UL (ref 1.82–7.42)
NEUTROPHILS NFR BLD: 80.9 % (ref 44–72)
NRBC # BLD AUTO: 0 K/UL
NRBC BLD-RTO: 0 /100 WBC (ref 0–0.2)
PLATELET # BLD AUTO: 348 K/UL (ref 164–446)
PMV BLD AUTO: 10.4 FL (ref 9–12.9)
RBC # BLD AUTO: 4.46 M/UL (ref 4.2–5.4)
T PALLIDUM AB SER QL IA: NORMAL
WBC # BLD AUTO: 9.4 K/UL (ref 4.8–10.8)

## 2023-09-20 PROCEDURE — 160009 HCHG ANES TIME/MIN: Performed by: OBSTETRICS & GYNECOLOGY

## 2023-09-20 PROCEDURE — 82962 GLUCOSE BLOOD TEST: CPT

## 2023-09-20 PROCEDURE — 59514 CESAREAN DELIVERY ONLY: CPT | Mod: 80 | Performed by: OBSTETRICS & GYNECOLOGY

## 2023-09-20 PROCEDURE — 85025 COMPLETE CBC W/AUTO DIFF WBC: CPT

## 2023-09-20 PROCEDURE — 160048 HCHG OR STATISTICAL LEVEL 1-5: Performed by: OBSTETRICS & GYNECOLOGY

## 2023-09-20 PROCEDURE — A9270 NON-COVERED ITEM OR SERVICE: HCPCS | Performed by: INTERNAL MEDICINE

## 2023-09-20 PROCEDURE — 770002 HCHG ROOM/CARE - OB PRIVATE (112)

## 2023-09-20 PROCEDURE — 76815 OB US LIMITED FETUS(S): CPT

## 2023-09-20 PROCEDURE — 36415 COLL VENOUS BLD VENIPUNCTURE: CPT

## 2023-09-20 PROCEDURE — C1765 ADHESION BARRIER: HCPCS | Performed by: OBSTETRICS & GYNECOLOGY

## 2023-09-20 PROCEDURE — 700105 HCHG RX REV CODE 258: Performed by: INTERNAL MEDICINE

## 2023-09-20 PROCEDURE — 58611 LIGATE OVIDUCT(S) ADD-ON: CPT | Mod: 80 | Performed by: OBSTETRICS & GYNECOLOGY

## 2023-09-20 PROCEDURE — 160029 HCHG SURGERY MINUTES - 1ST 30 MINS LEVEL 4: Performed by: OBSTETRICS & GYNECOLOGY

## 2023-09-20 PROCEDURE — 700111 HCHG RX REV CODE 636 W/ 250 OVERRIDE (IP): Mod: JZ | Performed by: INTERNAL MEDICINE

## 2023-09-20 PROCEDURE — 88302 TISSUE EXAM BY PATHOLOGIST: CPT

## 2023-09-20 PROCEDURE — 160002 HCHG RECOVERY MINUTES (STAT): Performed by: OBSTETRICS & GYNECOLOGY

## 2023-09-20 PROCEDURE — 700111 HCHG RX REV CODE 636 W/ 250 OVERRIDE (IP): Performed by: OBSTETRICS & GYNECOLOGY

## 2023-09-20 PROCEDURE — 700101 HCHG RX REV CODE 250: Performed by: INTERNAL MEDICINE

## 2023-09-20 PROCEDURE — 89060 EXAM SYNOVIAL FLUID CRYSTALS: CPT

## 2023-09-20 PROCEDURE — 302449 STATCHG TRIAGE ONLY (STATISTIC)

## 2023-09-20 PROCEDURE — 86780 TREPONEMA PALLIDUM: CPT

## 2023-09-20 PROCEDURE — 160035 HCHG PACU - 1ST 60 MINS PHASE I: Performed by: OBSTETRICS & GYNECOLOGY

## 2023-09-20 PROCEDURE — 700102 HCHG RX REV CODE 250 W/ 637 OVERRIDE(OP): Performed by: INTERNAL MEDICINE

## 2023-09-20 PROCEDURE — 700105 HCHG RX REV CODE 258: Performed by: OBSTETRICS & GYNECOLOGY

## 2023-09-20 PROCEDURE — 0UB70ZZ EXCISION OF BILATERAL FALLOPIAN TUBES, OPEN APPROACH: ICD-10-PCS | Performed by: OBSTETRICS & GYNECOLOGY

## 2023-09-20 PROCEDURE — C1755 CATHETER, INTRASPINAL: HCPCS | Performed by: OBSTETRICS & GYNECOLOGY

## 2023-09-20 PROCEDURE — 700111 HCHG RX REV CODE 636 W/ 250 OVERRIDE (IP): Mod: JZ | Performed by: OBSTETRICS & GYNECOLOGY

## 2023-09-20 RX ORDER — BISACODYL 10 MG
10 SUPPOSITORY, RECTAL RECTAL PRN
Status: DISCONTINUED | OUTPATIENT
Start: 2023-09-20 | End: 2023-09-22 | Stop reason: HOSPADM

## 2023-09-20 RX ORDER — IBUPROFEN 800 MG/1
800 TABLET ORAL EVERY 8 HOURS PRN
Status: DISCONTINUED | OUTPATIENT
Start: 2023-09-24 | End: 2023-09-20

## 2023-09-20 RX ORDER — ONDANSETRON 2 MG/ML
4 INJECTION INTRAMUSCULAR; INTRAVENOUS EVERY 6 HOURS PRN
Status: ACTIVE | OUTPATIENT
Start: 2023-09-20 | End: 2023-09-21

## 2023-09-20 RX ORDER — DIPHENHYDRAMINE HYDROCHLORIDE 50 MG/ML
25 INJECTION INTRAMUSCULAR; INTRAVENOUS EVERY 6 HOURS PRN
Status: DISCONTINUED | OUTPATIENT
Start: 2023-09-21 | End: 2023-09-22 | Stop reason: HOSPADM

## 2023-09-20 RX ORDER — MEPERIDINE HYDROCHLORIDE 25 MG/ML
12.5 INJECTION INTRAMUSCULAR; INTRAVENOUS; SUBCUTANEOUS
Status: DISCONTINUED | OUTPATIENT
Start: 2023-09-20 | End: 2023-09-20 | Stop reason: HOSPADM

## 2023-09-20 RX ORDER — BUPIVACAINE HYDROCHLORIDE 7.5 MG/ML
INJECTION, SOLUTION INTRASPINAL
Status: COMPLETED | OUTPATIENT
Start: 2023-09-20 | End: 2023-09-20

## 2023-09-20 RX ORDER — ACETAMINOPHEN 500 MG
1000 TABLET ORAL EVERY 6 HOURS
Status: COMPLETED | OUTPATIENT
Start: 2023-09-20 | End: 2023-09-21

## 2023-09-20 RX ORDER — IBUPROFEN 800 MG/1
800 TABLET ORAL EVERY 8 HOURS
Status: DISCONTINUED | OUTPATIENT
Start: 2023-09-21 | End: 2023-09-20

## 2023-09-20 RX ORDER — HYDROMORPHONE HYDROCHLORIDE 1 MG/ML
0.4 INJECTION, SOLUTION INTRAMUSCULAR; INTRAVENOUS; SUBCUTANEOUS
Status: ACTIVE | OUTPATIENT
Start: 2023-09-20 | End: 2023-09-21

## 2023-09-20 RX ORDER — ONDANSETRON 4 MG/1
4 TABLET, ORALLY DISINTEGRATING ORAL EVERY 6 HOURS PRN
Status: DISCONTINUED | OUTPATIENT
Start: 2023-09-21 | End: 2023-09-22 | Stop reason: HOSPADM

## 2023-09-20 RX ORDER — CEFAZOLIN SODIUM 1 G/3ML
2 INJECTION, POWDER, FOR SOLUTION INTRAMUSCULAR; INTRAVENOUS ONCE
Status: COMPLETED | OUTPATIENT
Start: 2023-09-20 | End: 2023-09-20

## 2023-09-20 RX ORDER — OXYCODONE HYDROCHLORIDE 5 MG/1
5 TABLET ORAL EVERY 4 HOURS PRN
Status: ACTIVE | OUTPATIENT
Start: 2023-09-20 | End: 2023-09-21

## 2023-09-20 RX ORDER — METOCLOPRAMIDE HYDROCHLORIDE 5 MG/ML
10 INJECTION INTRAMUSCULAR; INTRAVENOUS ONCE
Status: COMPLETED | OUTPATIENT
Start: 2023-09-20 | End: 2023-09-20

## 2023-09-20 RX ORDER — DOCUSATE SODIUM 100 MG/1
100 CAPSULE, LIQUID FILLED ORAL 2 TIMES DAILY PRN
Status: DISCONTINUED | OUTPATIENT
Start: 2023-09-20 | End: 2023-09-22 | Stop reason: HOSPADM

## 2023-09-20 RX ORDER — ONDANSETRON 2 MG/ML
INJECTION INTRAMUSCULAR; INTRAVENOUS PRN
Status: DISCONTINUED | OUTPATIENT
Start: 2023-09-20 | End: 2023-09-20 | Stop reason: SURG

## 2023-09-20 RX ORDER — MORPHINE SULFATE 0.5 MG/ML
INJECTION, SOLUTION EPIDURAL; INTRATHECAL; INTRAVENOUS
Status: COMPLETED | OUTPATIENT
Start: 2023-09-20 | End: 2023-09-20

## 2023-09-20 RX ORDER — EPHEDRINE SULFATE 50 MG/ML
10 INJECTION, SOLUTION INTRAVENOUS
Status: ACTIVE | OUTPATIENT
Start: 2023-09-20 | End: 2023-09-21

## 2023-09-20 RX ORDER — SODIUM CHLORIDE, SODIUM LACTATE, POTASSIUM CHLORIDE, CALCIUM CHLORIDE 600; 310; 30; 20 MG/100ML; MG/100ML; MG/100ML; MG/100ML
INJECTION, SOLUTION INTRAVENOUS PRN
Status: DISCONTINUED | OUTPATIENT
Start: 2023-09-20 | End: 2023-09-22 | Stop reason: HOSPADM

## 2023-09-20 RX ORDER — HALOPERIDOL 5 MG/ML
1 INJECTION INTRAMUSCULAR
Status: DISCONTINUED | OUTPATIENT
Start: 2023-09-20 | End: 2023-09-20 | Stop reason: HOSPADM

## 2023-09-20 RX ORDER — ONDANSETRON 2 MG/ML
4 INJECTION INTRAMUSCULAR; INTRAVENOUS EVERY 6 HOURS PRN
Status: DISCONTINUED | OUTPATIENT
Start: 2023-09-21 | End: 2023-09-22 | Stop reason: HOSPADM

## 2023-09-20 RX ORDER — DEXAMETHASONE SODIUM PHOSPHATE 4 MG/ML
INJECTION, SOLUTION INTRA-ARTICULAR; INTRALESIONAL; INTRAMUSCULAR; INTRAVENOUS; SOFT TISSUE PRN
Status: DISCONTINUED | OUTPATIENT
Start: 2023-09-20 | End: 2023-09-20 | Stop reason: SURG

## 2023-09-20 RX ORDER — DIPHENHYDRAMINE HYDROCHLORIDE 50 MG/ML
12.5 INJECTION INTRAMUSCULAR; INTRAVENOUS
Status: DISCONTINUED | OUTPATIENT
Start: 2023-09-20 | End: 2023-09-20 | Stop reason: HOSPADM

## 2023-09-20 RX ORDER — ONDANSETRON 2 MG/ML
4 INJECTION INTRAMUSCULAR; INTRAVENOUS
Status: DISCONTINUED | OUTPATIENT
Start: 2023-09-20 | End: 2023-09-20 | Stop reason: HOSPADM

## 2023-09-20 RX ORDER — OXYCODONE HCL 5 MG/5 ML
10 SOLUTION, ORAL ORAL
Status: DISCONTINUED | OUTPATIENT
Start: 2023-09-20 | End: 2023-09-20 | Stop reason: HOSPADM

## 2023-09-20 RX ORDER — ACETAMINOPHEN 500 MG
1000 TABLET ORAL EVERY 6 HOURS
Status: DISCONTINUED | OUTPATIENT
Start: 2023-09-21 | End: 2023-09-22 | Stop reason: HOSPADM

## 2023-09-20 RX ORDER — KETOROLAC TROMETHAMINE 30 MG/ML
INJECTION, SOLUTION INTRAMUSCULAR; INTRAVENOUS PRN
Status: DISCONTINUED | OUTPATIENT
Start: 2023-09-20 | End: 2023-09-20 | Stop reason: SURG

## 2023-09-20 RX ORDER — OXYCODONE HCL 5 MG/5 ML
5 SOLUTION, ORAL ORAL
Status: DISCONTINUED | OUTPATIENT
Start: 2023-09-20 | End: 2023-09-20 | Stop reason: HOSPADM

## 2023-09-20 RX ORDER — HYDROMORPHONE HYDROCHLORIDE 1 MG/ML
0.2 INJECTION, SOLUTION INTRAMUSCULAR; INTRAVENOUS; SUBCUTANEOUS
Status: DISCONTINUED | OUTPATIENT
Start: 2023-09-20 | End: 2023-09-20 | Stop reason: HOSPADM

## 2023-09-20 RX ORDER — SODIUM CHLORIDE, SODIUM GLUCONATE, SODIUM ACETATE, POTASSIUM CHLORIDE AND MAGNESIUM CHLORIDE 526; 502; 368; 37; 30 MG/100ML; MG/100ML; MG/100ML; MG/100ML; MG/100ML
1500 INJECTION, SOLUTION INTRAVENOUS ONCE
Status: COMPLETED | OUTPATIENT
Start: 2023-09-20 | End: 2023-09-20

## 2023-09-20 RX ORDER — DIPHENHYDRAMINE HYDROCHLORIDE 50 MG/ML
25 INJECTION INTRAMUSCULAR; INTRAVENOUS EVERY 6 HOURS PRN
Status: ACTIVE | OUTPATIENT
Start: 2023-09-20 | End: 2023-09-21

## 2023-09-20 RX ORDER — HYDRALAZINE HYDROCHLORIDE 20 MG/ML
5 INJECTION INTRAMUSCULAR; INTRAVENOUS
Status: DISCONTINUED | OUTPATIENT
Start: 2023-09-20 | End: 2023-09-20 | Stop reason: HOSPADM

## 2023-09-20 RX ORDER — SODIUM CHLORIDE, SODIUM LACTATE, POTASSIUM CHLORIDE, CALCIUM CHLORIDE 600; 310; 30; 20 MG/100ML; MG/100ML; MG/100ML; MG/100ML
INJECTION, SOLUTION INTRAVENOUS ONCE
Status: ACTIVE | OUTPATIENT
Start: 2023-09-20 | End: 2023-09-21

## 2023-09-20 RX ORDER — SODIUM CHLORIDE, SODIUM GLUCONATE, SODIUM ACETATE, POTASSIUM CHLORIDE AND MAGNESIUM CHLORIDE 526; 502; 368; 37; 30 MG/100ML; MG/100ML; MG/100ML; MG/100ML; MG/100ML
INJECTION, SOLUTION INTRAVENOUS
Status: DISCONTINUED | OUTPATIENT
Start: 2023-09-20 | End: 2023-09-20 | Stop reason: SURG

## 2023-09-20 RX ORDER — ACETAMINOPHEN 500 MG
1000 TABLET ORAL EVERY 6 HOURS PRN
Status: DISCONTINUED | OUTPATIENT
Start: 2023-09-24 | End: 2023-09-22 | Stop reason: HOSPADM

## 2023-09-20 RX ORDER — HYDROMORPHONE HYDROCHLORIDE 1 MG/ML
0.1 INJECTION, SOLUTION INTRAMUSCULAR; INTRAVENOUS; SUBCUTANEOUS
Status: DISCONTINUED | OUTPATIENT
Start: 2023-09-20 | End: 2023-09-20 | Stop reason: HOSPADM

## 2023-09-20 RX ORDER — OXYCODONE HYDROCHLORIDE 10 MG/1
10 TABLET ORAL EVERY 4 HOURS PRN
Status: DISPENSED | OUTPATIENT
Start: 2023-09-20 | End: 2023-09-21

## 2023-09-20 RX ORDER — OXYTOCIN 10 [USP'U]/ML
INJECTION, SOLUTION INTRAMUSCULAR; INTRAVENOUS PRN
Status: DISCONTINUED | OUTPATIENT
Start: 2023-09-20 | End: 2023-09-20 | Stop reason: SURG

## 2023-09-20 RX ORDER — CITRIC ACID/SODIUM CITRATE 334-500MG
30 SOLUTION, ORAL ORAL ONCE
Status: COMPLETED | OUTPATIENT
Start: 2023-09-20 | End: 2023-09-20

## 2023-09-20 RX ORDER — DIPHENHYDRAMINE HYDROCHLORIDE 50 MG/ML
12.5 INJECTION INTRAMUSCULAR; INTRAVENOUS EVERY 6 HOURS PRN
Status: ACTIVE | OUTPATIENT
Start: 2023-09-20 | End: 2023-09-21

## 2023-09-20 RX ORDER — HYDROMORPHONE HYDROCHLORIDE 1 MG/ML
0.4 INJECTION, SOLUTION INTRAMUSCULAR; INTRAVENOUS; SUBCUTANEOUS
Status: DISCONTINUED | OUTPATIENT
Start: 2023-09-20 | End: 2023-09-20 | Stop reason: HOSPADM

## 2023-09-20 RX ORDER — KETOROLAC TROMETHAMINE 30 MG/ML
30 INJECTION, SOLUTION INTRAMUSCULAR; INTRAVENOUS EVERY 6 HOURS
Status: COMPLETED | OUTPATIENT
Start: 2023-09-20 | End: 2023-09-21

## 2023-09-20 RX ORDER — MISOPROSTOL 200 UG/1
800 TABLET ORAL
Status: DISCONTINUED | OUTPATIENT
Start: 2023-09-20 | End: 2023-09-22 | Stop reason: HOSPADM

## 2023-09-20 RX ORDER — DIPHENHYDRAMINE HCL 25 MG
25 TABLET ORAL EVERY 6 HOURS PRN
Status: DISCONTINUED | OUTPATIENT
Start: 2023-09-21 | End: 2023-09-22 | Stop reason: HOSPADM

## 2023-09-20 RX ORDER — SCOLOPAMINE TRANSDERMAL SYSTEM 1 MG/1
PATCH, EXTENDED RELEASE TRANSDERMAL PRN
Status: DISCONTINUED | OUTPATIENT
Start: 2023-09-20 | End: 2023-09-20 | Stop reason: SURG

## 2023-09-20 RX ORDER — LABETALOL HYDROCHLORIDE 5 MG/ML
5 INJECTION, SOLUTION INTRAVENOUS
Status: DISCONTINUED | OUTPATIENT
Start: 2023-09-20 | End: 2023-09-20 | Stop reason: HOSPADM

## 2023-09-20 RX ORDER — OXYTOCIN 10 [USP'U]/ML
10 INJECTION, SOLUTION INTRAMUSCULAR; INTRAVENOUS
Status: DISCONTINUED | OUTPATIENT
Start: 2023-09-20 | End: 2023-09-22 | Stop reason: HOSPADM

## 2023-09-20 RX ORDER — OXYCODONE HYDROCHLORIDE 5 MG/1
5 TABLET ORAL EVERY 4 HOURS PRN
Status: DISCONTINUED | OUTPATIENT
Start: 2023-09-21 | End: 2023-09-22 | Stop reason: HOSPADM

## 2023-09-20 RX ORDER — HYDROMORPHONE HYDROCHLORIDE 1 MG/ML
0.2 INJECTION, SOLUTION INTRAMUSCULAR; INTRAVENOUS; SUBCUTANEOUS
Status: ACTIVE | OUTPATIENT
Start: 2023-09-20 | End: 2023-09-21

## 2023-09-20 RX ORDER — CARBOPROST TROMETHAMINE 250 UG/ML
250 INJECTION, SOLUTION INTRAMUSCULAR
Status: DISCONTINUED | OUTPATIENT
Start: 2023-09-20 | End: 2023-09-22 | Stop reason: HOSPADM

## 2023-09-20 RX ORDER — OXYCODONE HYDROCHLORIDE 10 MG/1
10 TABLET ORAL EVERY 4 HOURS PRN
Status: DISCONTINUED | OUTPATIENT
Start: 2023-09-21 | End: 2023-09-22 | Stop reason: HOSPADM

## 2023-09-20 RX ADMIN — KETOROLAC TROMETHAMINE 30 MG: 30 INJECTION, SOLUTION INTRAMUSCULAR at 22:06

## 2023-09-20 RX ADMIN — MORPHINE SULFATE 150 MCG: 0.5 INJECTION, SOLUTION EPIDURAL; INTRATHECAL; INTRAVENOUS at 09:01

## 2023-09-20 RX ADMIN — OXYTOCIN 125 ML/HR: 10 INJECTION, SOLUTION INTRAMUSCULAR; INTRAVENOUS at 10:51

## 2023-09-20 RX ADMIN — ONDANSETRON 4 MG: 2 INJECTION INTRAMUSCULAR; INTRAVENOUS at 08:56

## 2023-09-20 RX ADMIN — OXYTOCIN 20 UNITS: 10 INJECTION, SOLUTION INTRAMUSCULAR; INTRAVENOUS at 09:30

## 2023-09-20 RX ADMIN — SODIUM CHLORIDE, SODIUM GLUCONATE, SODIUM ACETATE, POTASSIUM CHLORIDE AND MAGNESIUM CHLORIDE 1500 ML: 526; 502; 368; 37; 30 INJECTION, SOLUTION INTRAVENOUS at 08:12

## 2023-09-20 RX ADMIN — BUPIVACAINE HYDROCHLORIDE IN DEXTROSE 1.6 ML: 7.5 INJECTION, SOLUTION SUBARACHNOID at 09:01

## 2023-09-20 RX ADMIN — FAMOTIDINE 20 MG: 10 INJECTION, SOLUTION INTRAVENOUS at 08:19

## 2023-09-20 RX ADMIN — SODIUM CITRATE AND CITRIC ACID MONOHYDRATE 30 ML: 500; 334 SOLUTION ORAL at 08:19

## 2023-09-20 RX ADMIN — ACETAMINOPHEN 1000 MG: 500 TABLET, FILM COATED ORAL at 12:23

## 2023-09-20 RX ADMIN — OXYCODONE HYDROCHLORIDE 10 MG: 10 TABLET ORAL at 14:02

## 2023-09-20 RX ADMIN — KETOROLAC TROMETHAMINE 30 MG: 30 INJECTION, SOLUTION INTRAMUSCULAR; INTRAVENOUS at 10:00

## 2023-09-20 RX ADMIN — CEFAZOLIN 2 G: 1 INJECTION, POWDER, FOR SOLUTION INTRAMUSCULAR; INTRAVENOUS at 09:06

## 2023-09-20 RX ADMIN — KETOROLAC TROMETHAMINE 30 MG: 30 INJECTION, SOLUTION INTRAMUSCULAR at 15:44

## 2023-09-20 RX ADMIN — METOCLOPRAMIDE 10 MG: 5 INJECTION, SOLUTION INTRAMUSCULAR; INTRAVENOUS at 08:19

## 2023-09-20 RX ADMIN — DEXAMETHASONE SODIUM PHOSPHATE 8 MG: 4 INJECTION INTRA-ARTICULAR; INTRALESIONAL; INTRAMUSCULAR; INTRAVENOUS; SOFT TISSUE at 09:06

## 2023-09-20 RX ADMIN — ACETAMINOPHEN 1000 MG: 500 TABLET, FILM COATED ORAL at 18:51

## 2023-09-20 RX ADMIN — SODIUM CHLORIDE, SODIUM GLUCONATE, SODIUM ACETATE, POTASSIUM CHLORIDE AND MAGNESIUM CHLORIDE: 526; 502; 368; 37; 30 INJECTION, SOLUTION INTRAVENOUS at 08:49

## 2023-09-20 RX ADMIN — PHENYLEPHRINE HYDROCHLORIDE 0.5 MCG/KG/MIN: 10 INJECTION INTRAVENOUS at 09:03

## 2023-09-20 RX ADMIN — FENTANYL CITRATE 15 MCG: 50 INJECTION, SOLUTION INTRAMUSCULAR; INTRAVENOUS at 09:01

## 2023-09-20 RX ADMIN — OXYTOCIN 500 ML: 10 INJECTION, SOLUTION INTRAMUSCULAR; INTRAVENOUS at 10:00

## 2023-09-20 RX ADMIN — SCOPOLAMINE 1 PATCH: 1.5 PATCH, EXTENDED RELEASE TRANSDERMAL at 09:31

## 2023-09-20 ASSESSMENT — PAIN SCALES - GENERAL
PAIN_LEVEL: 2
PAINLEVEL: 5 - MODERATE PAIN

## 2023-09-20 ASSESSMENT — PATIENT HEALTH QUESTIONNAIRE - PHQ9
SUM OF ALL RESPONSES TO PHQ9 QUESTIONS 1 AND 2: 0
2. FEELING DOWN, DEPRESSED, IRRITABLE, OR HOPELESS: NOT AT ALL
1. LITTLE INTEREST OR PLEASURE IN DOING THINGS: NOT AT ALL

## 2023-09-20 ASSESSMENT — LIFESTYLE VARIABLES
HAVE YOU EVER FELT YOU SHOULD CUT DOWN ON YOUR DRINKING: NO
EVER HAD A DRINK FIRST THING IN THE MORNING TO STEADY YOUR NERVES TO GET RID OF A HANGOVER: NO
CONSUMPTION TOTAL: INCOMPLETE
EVER FELT BAD OR GUILTY ABOUT YOUR DRINKING: NO
TOTAL SCORE: 0
DOES PATIENT WANT TO STOP DRINKING: NO
TOTAL SCORE: 0
TOTAL SCORE: 0
HAVE PEOPLE ANNOYED YOU BY CRITICIZING YOUR DRINKING: NO
ALCOHOL_USE: NO

## 2023-09-20 ASSESSMENT — PAIN DESCRIPTION - PAIN TYPE
TYPE: SURGICAL PAIN
TYPE: ACUTE PAIN
TYPE: ACUTE PAIN

## 2023-09-20 ASSESSMENT — FIBROSIS 4 INDEX: FIB4 SCORE: 0.39

## 2023-09-20 NOTE — ANESTHESIA TIME REPORT
Anesthesia Start and Stop Event Times     Date Time Event    9/20/2023 0844 Ready for Procedure     0849 Anesthesia Start     1011 Anesthesia Stop        Responsible Staff  09/20/23    Name Role Begin End    Stephen Clarke M.D. Anesth 0849 1011        Overtime Reason:  no overtime (within assigned shift)    Comments:

## 2023-09-20 NOTE — PROGRESS NOTES
Patient brought from labor and delivery via gurney.  Patient oriented to room and surroundings. Id bands and security issues discussed. Including not letting anyone take infant without pink photo id. No sleeping with infant, no carrying infant in halls, and no leaving infant unattended. 0-10 pain scale and pain management discussed. Fundus firm with light lochia. IV infusing without redness or swelling.  Family at bedside.  Patient encouraged to call before getting out of bed  until stable.  Patient encouraged to call for any needs.

## 2023-09-20 NOTE — PROGRESS NOTES
0700 Report received from LEATHA Moore. Assumed care at this time.      0849 Patient ambulated to OR 2.    0928 Delivery of viable female. 8/9 APGAR    1008 Patient arrival to PACU bed 3 via gurney. Patient educated on bleeding expectations.     1115 Patient transferred via gurney in apparent stable condition with infant in arms and with all personal belongings to Postpartum department. Report given to LEATHA Graf. Bleeding assessed, infants cuddles and bands verified. Transfer of care at this time.

## 2023-09-20 NOTE — OP REPORT
Operative Note    Pre Operative Diagnosis:   intrauterine pregnancy 36 weeks 3 days  Oligohydramnios  Maternal chronic hypertension  Prior  delivery x3  Desires permanent sterilization    Post Operative Diagnosis:   intrauterine pregnancy 36 weeks 3 days  Oligohydramnios  Maternal chronic hypertension  Prior  delivery x3  Desires permanent sterilization    Procedure:  Repeat low transverse   Bilateral salpingectomy    Surgeon:Keon BARFIELD    Assistant: Ector BARFIELD    Intraoperative findings: Normal appearing uterus, fallopian tubes and ovaries bilaterally, female infant in vertex presentation with clear fluid, spontaneous cry at delivery, APGARS 8/9    Anesthesia:Spinal    EBL: 500cc    IVF:1500cc    UOP:75cc clear at procedure end    Specimens: None    Complications:none    Dispo:to PACU    Indications and Consent: Aleyda Verma is a 37 yo  who presented at 36w3d with complaints of leaking fluid. Her fern was negative but her LARS was 2.5cm. She has a history of prior  delivery x3 and desires repeat . She desires permanent sterilization. We discussed the risks, benefits, and alternatives to repeat low transverse  with bilateral salpingectomy. She stated understanding and desired to proceed. Consent forms were signed. All questions were answered.     Procedure in detail: The patient was taken to the operating room where she underwent spinal anesthesia.   She was prepped and draped in dorsal supine position with a leftward tilt.  A timeout was performed.  A Pfannenstiel incision was made on her skin with a scalpel.  It was carried down to the fascia with the Bovie.  The fascia was incised and extended laterally.  The superior and inferior aspects of the fascia were grasped with Kocher clamps and the rectus muscles were dissected off sharply.  The peritoneum was entered bluntly.  The incision was extended superiorly and inferiorly to good visualization of  the bladder.  An Dennis O retractor was placed.  A bladder flap was created with Metzenbaum scissors. A low transverse incision was made on the lower uterine segment with a scalpel.  It was carried down to the amniotic sac which was entered bluntly.  The head was flexed to the hysterotomy and delivered with gentle fundal pressure.  The cord was clamped and cut after a 30 second delay.  The infant was handed to the nursery nurse.  The placenta was delivered with fundal massage.  The inner lining of the uterus was wiped clean with moist laparotomy sponges.  The uterus was reapproximated in one layer with 0 chromic.  The bilateral fallopian tubes and ovaries were examined and found be within normal limits.The left fallopian tube was identified and followed out to the fimbria. It was grasped with a angelica clamp and was sequentially coagulated and cut traveling along the mesosalpinx until it could be resected one centimeter away from the uterine cornu. The right fallopian tube was identified and was similarly excised. Both were sent to pathology.  The Dennis O retractor was removed.  The peritoneum was reapproximated with 3-0 Vicryl.  The rectus muscles were noted to be hemostatic and were not reapproximated.  The fascia was reapproximated with 0 Vicryl.  The subcutaneous space was closed with 3-0 Vicryl.  The skin was closed with 4-0 Vicryl.  All counts were correct x2.

## 2023-09-20 NOTE — H&P
Labor and delivery history and physical    CC: Leaking fluid    HPI:Aleyda Galicia is a 38-year-old G5, P3 who presented to OB triage today at 36 weeks and 3 days with complaints of leaking fluid, contractions, and pain around her  scar.  She denies bleeding.  She reports good fetal movement.  In OB triage, there was no visualized fluid on her perineum or pooling on exam.  Her ferning was negative.  However, an abdominal ultrasound demonstrated a significant decrease in amniotic fluid index from her prior exam on 2023 where it was 8.89 and is now 2.5 cm.  She has a history of prior  delivery x3.  She thought she had had a bilateral salpingectomy with her last  but had not.  She desires repeat  delivery and also permanent sterilization with bilateral salpingectomy.    She received her prenatal care with Dr. Desai this pregnancy.  It was complicated by chronic hypertension, obesity, and history of gastric bypass procedure.  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  She was referred to [facility] for her comorbidities.  She had a normal anatomic scan completed at 20 weeks and 2 days.  She had subsequent follow-up biometry, the most recent of which demonstrated estimated fetal weight in the 29th percentile at 32 weeks.  She was started on nonstress testing at 32 weeks and it was largely reassuring.  Delivery was previously recommended at 38 weeks because of her chronic hypertension.  She has been seen periodically in OB triage in her third trimester secondary to painful contractions and  scar pain.  She is never dilated her cervix however given the concern for increased risk of uterine rupture, her  was recently moved up to 37 weeks.    ROS: All other systems reviewed were found to be negative    PMH: Chronic hypertension, obesity  PSH: Steven-en-Y gastric bypass, abdominoplasty, removal of excessive skin on her bilateral arms,  "breast augmentation,  delivery x3  OB Hx:  SAB x1  2004: Primary  delivery due to failure to descend, male, 6 pounds 9 ounces, no complications  2011: RLTCS, female, 4 pounds 7 ounces  2013: RLTCS, male, 5 pounds 9 ounces  GYN Hx: Denies history of sexually transmitted infection including herpes simplex virus for herself or her spouse.  Denies abnormal Pap smears.  Her last Pap was NILM in   SH: Denies tobacco, alcohol, or illicit drug use.  She has a past history of tobacco use.  She is a hairstylist.  FH: Heart disease, hypertension  Medications: PNV  Allergies: Sulfa, ibuprofen, K-Y jelly, latex    /80   Pulse 90   Temp 36.4 °C (97.6 °F)   Ht 1.727 m (5' 8\")   Wt 115 kg (254 lb)   LMP 2022   SpO2 97%   BMI 38.62 kg/m²   General: No apparent distress  Abdomen: Gravid, nontender  Extremities: mild bilateral LE edema    FHT: baseline of 140's, moderate variability present, accelerations present, decelerations absent  [place]:low amplitude q2 min    Pertinent lab results  ABO a positive  GBS unknown  GTT unable to perform secondary to dumping syndrome however 1 week of blood sugars within normal limits  HIV negative  Hep B negative  Hep C negative  RPR negative  Rubella immune    Assessment:   intrauterine pregnancy 36 weeks 3 days  Oligohydramnios  Maternal chronic hypertension  Prior  delivery x3  Desires permanent sterilization    Plan:  Reviewed findings of fern test and amniotic fluid index with patient and spouse.  Discussed my concern that even if she is not ruptured, placental insufficiency may be a problem at this point as is evidenced by the drastic change in her amniotic fluid index.  Given the fact we plan to proceed with  in 4 days time, and the increased risk for cord accident with low LARS at this point in time, we will proceed with  delivery now.  I had a we discussed the risk, benefits, and alternatives.  She stated understanding and " desire to proceed.  We also discussed the unique risks associated with permanent sterilization by way of bilateral salpingectomy.  She is amenable.  We will proceed with this as well.  All questions were answered.    Julio Cesar Herbert M.D.

## 2023-09-20 NOTE — ANESTHESIA PROCEDURE NOTES
Spinal Block    Date/Time: 9/20/2023 9:01 AM    Performed by: Stephen Clarke M.D.  Authorized by: Stephen Clarke M.D.    Patient Location:  OR  Start Time:  9/20/2023 9:01 AM  End Time:  9/20/2023 9:03 AM  Reason for Block: primary anesthetic    patient identified, IV checked, site marked, risks and benefits discussed, surgical consent, monitors and equipment checked, pre-op evaluation and timeout performed    Patient Position:  Sitting  Prep: ChloraPrep, patient draped and sterile technique    Monitoring:  Blood pressure, continuous pulse oximetry and heart rate  Approach:  Midline  Location:  L3-4  Injection Technique:  Single-shot  Skin infiltration:  Lidocaine  Strength:  1%  Dose:  3ml  Needle Type:  Pencan  Needle Gauge:  25 G  CSF flowing pre/post injection:  Yes  Sensory Level:  T4

## 2023-09-20 NOTE — PROGRESS NOTES
Patient helped to bathroom and taught kathe care.  Patient ambulated with steady gait. Denies dizzyness. Will continue to monitor.

## 2023-09-20 NOTE — PROGRESS NOTES
G5 P 3 EDC 10/15/23 @ 36.3 WKS     0355 - Pt and FOB ambulate to LDA 4 in stable condition with C/O CTX that are around 2-5 min apart, pain at her C/S scar and is unsure if her water is broken. Patient states that this began last night and has progressively become worse with CTX/abdominal pain. Patient states that she woke up and her panties were wet, she did not have a large gush and is not currently leaking fluid. Patient affirms +FM though states she seems to be moving slower this AM. Patient adds that 2 days ago she passed a small amount of blood, is not currently having any VB. Patient states that she is very nauseous as well. Patient has hx of C/S x3. The pain that she is having is sharp, sudden onset and she rates this a 7/10. Patient states that this feels as it did about a week prior when she was here.  FERN collected, SVE as noted in flowsheets. No gross pooling noted in vagina, perineum dry. Patients hx is significant for many abdominal surgeries and CHTN, see provider notes for details. Otherwise uncomplicated this pregnancy.      0431 - Working updated of patient arrival, complaint, vitals, hx, FERN and SVE. Plan to evaluate following FERN results.     0435 - POC discussed with pt and FOB at this time, pt agreeable.     0500 - Working updated of negative FERN. Working to come to bedside to assess patient.     0506 - Orders received from  for US to assess LARS. Order placed.     0547 - US to bedside at this time.     0557 - US leaves bedside. Verbal report of LARS of 2.5cm. Awaiting final result.     0600 - Patient states she would like to walk around, shes discomforted and would like to stand for a while.     0652 - US tech contacted. Report to be published in a few minutes.     0700 - Report given to Modesta ZHANG. POC discussed, relinquished care of patient at this time.

## 2023-09-20 NOTE — ANESTHESIA POSTPROCEDURE EVALUATION
Patient: Aleyda Verma    Procedure Summary     Date: 23 Room / Location: [facility] OR 02 / SURGERY LABOR AND DELIVERY    Anesthesia Start: 849 Anesthesia Stop:     Procedure: REPEAT  SECTION WITH BILATERAL  SALPINGECTOMY (Abdomen) Diagnosis: (delivered)    Surgeons: Julio Cesar Herbert M.D. Responsible Provider: Stephen Clarke M.D.    Anesthesia Type: spinal ASA Status: 2 - Emergent          Final Anesthesia Type: spinal  Last vitals  BP   Blood Pressure: 105/55    Temp   36.1 °C (97 °F)    Pulse   74   Resp   18    SpO2   97 %      Anesthesia Post Evaluation    Patient location during evaluation: PACU  Patient participation: complete - patient participated  Level of consciousness: awake and alert  Pain score: 2    Airway patency: patent  Anesthetic complications: no  Cardiovascular status: hemodynamically stable  Respiratory status: acceptable  Hydration status: euvolemic    PONV: none    patient able to participate, but full recovery from regional anesthesia has not occurred and is not expected within the stipulated timeframe for the completion of the evaluation      There were no known notable events for this encounter.     Nurse Pain Score: 2 (NPRS)

## 2023-09-20 NOTE — LACTATION NOTE
Aleyda is a 38 year old  who delivered via repeat C/S this am. Her daughter Celina was born at 36+3 weeks and weighed 4 lbs. 8.7 ounces.     Aleyda's prenatal history includes Oligohydramnios, hypertension, and gastric bypass surgery.    Celina's initial blood sugar was 42 and she was given 10ml formula via bottle. Mom states she also  vigorously for 40+ minutes.    We discussed offering the breast on demand and at least every three hours. We discussed special concerns with the LPI baby; temperature instability, tiring with feedings, unstable blood sugars initially. Aleyda understands and will supplement and pump if need arises.    Mom assured of continuing support.

## 2023-09-20 NOTE — ANESTHESIA PREPROCEDURE EVALUATION
Case: [id number] Date/Time: 23    Procedure: REPEAT  SECTION WITH BILATERAL  SALPINGECTOMY    Pre-op diagnosis: PRIOR CSECTION, STERILIZATION, DESIRES STERILIZATION, HYPERTENSION , LOW LARS    Location: [facility] OR 02 / SURGERY LABOR AND DELIVERY    Surgeons: Julio Cesar Herbert M.D.        Patient presents for repeat  for pre-term labor     Anesthesia: No problems with Anesthesia.  Resp: No asthma or COPD history.  Cards: No pre-eclampsia, or known cardiac abnormalities.  Heme: No known bleeding disorders, platelets reviewed.    Risks of procedure discussed including: infection, bleeding, nerve damage, and post-dural puncture headache.     Relevant Problems   NEURO   (positive) Migraine without aura      CARDIAC   (positive) Migraine without aura      OB   (positive) Premature labor      Other   (positive) History of  section   (positive) Obesity complicating pregnancy in third trimester       Physical Exam    Airway   Mallampati: II  TM distance: >3 FB  Neck ROM: full       Cardiovascular - normal exam  Rhythm: regular  Rate: normal  (-) murmur     Dental - normal exam           Pulmonary - normal exam  Breath sounds clear to auscultation     Abdominal    Neurological - normal exam                 Anesthesia Plan    ASA 2- EMERGENT   ASA physical status emergent criteria: non-reassuring fetal heart tones    Plan - spinal   Neuraxial block will be primary anesthetic                Postoperative Plan: Postoperative administration of opioids is intended.    Pertinent diagnostic labs and testing reviewed    Informed Consent:    Anesthetic plan and risks discussed with patient.

## 2023-09-20 NOTE — DISCHARGE PLANNING
Discharge Planning Assessment Post Partum    Reason for Referral: History of anxiety  Address: [address] [place] [id number]  Phone: [phone]  Type of Living Situation: living with FOB and children  Mom Diagnosis: Pregnancy,    Baby Diagnosis: [place]-36.3 weeks  Primary Language: English    Name of Baby: Celina Galicia (: 23)  Father of the Baby: Omar Galicia   Involved in baby’s care? Yes  Contact Information: [phone]    Prenatal Care: Yes-Dr Desai   Mom's PCP: No PCP listed  PCP for new baby: Dr. Colletti    Support System: FOB  Coping/Bonding between mother & baby: Yes  Source of Feeding: breast and bottle   Supplies for Infant: prepared for infant; denies any needs    Mom's Insurance: [employer] and [employer]  Baby Covered on Insurance:Yes  Mother Employed/School:    Other children in the home/names & ages: three children; ages 13, 4, and 23 months    Financial Hardship/Income: No   Mom's Mental status: alert and oriented  Services used prior to admit: [employer]    CPS History: No  Psychiatric History: history of anxiety  Domestic Violence History: No  Drug/ETOH History: No    Resources Provided: children and family resource list and diaper bank assistance resources  Referrals Made: None     Clearance for Discharge: Infant is cleared to discharge home with parents once medically cleared

## 2023-09-21 PROBLEM — Z98.891 S/P CESAREAN SECTION: Status: RESOLVED | Noted: 2023-09-20 | Resolved: 2023-09-21

## 2023-09-21 LAB
ERYTHROCYTE [DISTWIDTH] IN BLOOD BY AUTOMATED COUNT: 43.3 FL (ref 35.9–50)
HCT VFR BLD AUTO: 26.1 % (ref 37–47)
HGB BLD-MCNC: 8.2 G/DL (ref 12–16)
MCH RBC QN AUTO: 23.8 PG (ref 27–33)
MCHC RBC AUTO-ENTMCNC: 31.4 G/DL (ref 32.2–35.5)
MCV RBC AUTO: 75.9 FL (ref 81.4–97.8)
PATHOLOGY CONSULT NOTE: NORMAL
PLATELET # BLD AUTO: 288 K/UL (ref 164–446)
PMV BLD AUTO: 10.8 FL (ref 9–12.9)
RBC # BLD AUTO: 3.44 M/UL (ref 4.2–5.4)
WBC # BLD AUTO: 12.6 K/UL (ref 4.8–10.8)

## 2023-09-21 PROCEDURE — 700111 HCHG RX REV CODE 636 W/ 250 OVERRIDE (IP): Mod: JZ | Performed by: INTERNAL MEDICINE

## 2023-09-21 PROCEDURE — 700102 HCHG RX REV CODE 250 W/ 637 OVERRIDE(OP): Performed by: INTERNAL MEDICINE

## 2023-09-21 PROCEDURE — A9270 NON-COVERED ITEM OR SERVICE: HCPCS | Performed by: INTERNAL MEDICINE

## 2023-09-21 PROCEDURE — A9270 NON-COVERED ITEM OR SERVICE: HCPCS | Performed by: OBSTETRICS & GYNECOLOGY

## 2023-09-21 PROCEDURE — 85027 COMPLETE CBC AUTOMATED: CPT

## 2023-09-21 PROCEDURE — 36415 COLL VENOUS BLD VENIPUNCTURE: CPT

## 2023-09-21 PROCEDURE — 770002 HCHG ROOM/CARE - OB PRIVATE (112)

## 2023-09-21 PROCEDURE — 700102 HCHG RX REV CODE 250 W/ 637 OVERRIDE(OP): Performed by: OBSTETRICS & GYNECOLOGY

## 2023-09-21 RX ADMIN — OXYCODONE HYDROCHLORIDE 5 MG: 5 TABLET ORAL at 21:38

## 2023-09-21 RX ADMIN — ACETAMINOPHEN 1000 MG: 500 TABLET, FILM COATED ORAL at 18:42

## 2023-09-21 RX ADMIN — ACETAMINOPHEN 1000 MG: 500 TABLET, FILM COATED ORAL at 06:27

## 2023-09-21 RX ADMIN — ACETAMINOPHEN 1000 MG: 500 TABLET, FILM COATED ORAL at 00:42

## 2023-09-21 RX ADMIN — DOCUSATE SODIUM 100 MG: 100 CAPSULE, LIQUID FILLED ORAL at 21:38

## 2023-09-21 RX ADMIN — KETOROLAC TROMETHAMINE 30 MG: 30 INJECTION, SOLUTION INTRAMUSCULAR at 09:45

## 2023-09-21 RX ADMIN — ACETAMINOPHEN 1000 MG: 500 TABLET, FILM COATED ORAL at 12:45

## 2023-09-21 RX ADMIN — KETOROLAC TROMETHAMINE 30 MG: 30 INJECTION, SOLUTION INTRAMUSCULAR at 04:07

## 2023-09-21 ASSESSMENT — EDINBURGH POSTNATAL DEPRESSION SCALE (EPDS)
THINGS HAVE BEEN GETTING ON TOP OF ME: NO, MOST OF THE TIME I HAVE COPED QUITE WELL
THE THOUGHT OF HARMING MYSELF HAS OCCURRED TO ME: NEVER
I HAVE BLAMED MYSELF UNNECESSARILY WHEN THINGS WENT WRONG: NOT VERY OFTEN
I HAVE LOOKED FORWARD WITH ENJOYMENT TO THINGS: AS MUCH AS I EVER DID
I HAVE BEEN SO UNHAPPY THAT I HAVE HAD DIFFICULTY SLEEPING: NOT AT ALL
I HAVE FELT SAD OR MISERABLE: NOT VERY OFTEN
I HAVE BEEN SO UNHAPPY THAT I HAVE BEEN CRYING: NO, NEVER
I HAVE BEEN ABLE TO LAUGH AND SEE THE FUNNY SIDE OF THINGS: AS MUCH AS I ALWAYS COULD
I HAVE FELT SCARED OR PANICKY FOR NO GOOD REASON: NO, NOT MUCH
I HAVE BEEN ANXIOUS OR WORRIED FOR NO GOOD REASON: YES, SOMETIMES

## 2023-09-21 ASSESSMENT — PAIN DESCRIPTION - PAIN TYPE
TYPE: SURGICAL PAIN
TYPE: ACUTE PAIN;SURGICAL PAIN
TYPE: SURGICAL PAIN
TYPE: SURGICAL PAIN
TYPE: ACUTE PAIN;SURGICAL PAIN
TYPE: ACUTE PAIN;SURGICAL PAIN
TYPE: SURGICAL PAIN

## 2023-09-21 NOTE — CARE PLAN
The patient is Stable - Low risk of patient condition declining or worsening    Shift Goals  Clinical Goals: VSS,light lochia,incision dressing clean,dry and intact  Patient Goals: pain control,breast and formula feed  Family Goals: not present    Progress made toward(s) clinical / shift goals:  progressing    Patient is not progressing towards the following goals:

## 2023-09-21 NOTE — LACTATION NOTE
"This note was copied from a baby's chart.  Lactation follow-up visit    MOB chooses to breast & bottle feed, baby gained weight 0.2%, , baby 36.3 weeks LPI. MOB reports she breast fed previous (3) babies between 3 months - 3 weeks, mother experienced. MOB states this baby is latching & breastfeeding well, latch not see at this time.  provided \"supplemental Guidelines\" 10-20-30 with review. Discussed always put baby to breast first then offer formula according to guidelines after breastfeed.    Feed baby with feeding cues and at least a minimum of 8x/24 hours.  Expect cluster feeding as this is normal during early days of life and growth spurts.  It is not recommended to let baby sleep longer than 4 hours between feedings and if sleepy, place skin to skin to promote feeding interest and milk production.  Baby's usually feed more frequently and longer when skin to skin with mother. It is not recommended to use pacifiers.    MOB has Medical & WIC, recommended mother follow-up with WIC for OP breastfeeding support.     "

## 2023-09-21 NOTE — CARE PLAN
The patient is Stable - Low risk of patient condition declining or worsening    Shift Goals  Clinical Goals: lochia wdl  Patient Goals: pain control,breast and formula feed  Family Goals: not present    Progress made toward(s) clinical / shift goals:  FF@U with light lochia    Patient is not progressing towards the following goals:

## 2023-09-21 NOTE — PROGRESS NOTES
Assessment done. Vital signs stable.  claims to be passing flatus. Fundus firm at umbilicus with light lochia. Dressing over low abdominal incision clean, dry, and intact. Patient ambulating with steady gait. Patient claims to have good pain relief with p.o meds. Breast and bottle feeding infant on demand. Family at bedside. Patient encouraged to call for any needs.

## 2023-09-21 NOTE — PROGRESS NOTES
"OB Post Operative Note    Has not gotten up yet.  Sethi in place.  Pain is controlled.  Normal lochia.  Tolerating p.o.  Feeling like she has flatus moving in her abdomen.  Working on breast-feeding.    Vitals  BP 96/58   Pulse (!) 56   Temp 36.6 °C (97.9 °F) (Temporal)   Resp 18   Ht 1.727 m (5' 8\")   Wt 115 kg (254 lb)   LMP 11/01/2022   SpO2 98%   Breastfeeding Yes   BMI 38.62 kg/m²     Gen: NAD, resting comfortably  GI: soft, non tender to palpation, incision c/d/i with Mepilex dressing in place  :fundus firm and below umbilicus  Ext: no edema    Recent Labs     09/20/23  0815   WBC 9.4   RBC 4.46   HEMOGLOBIN 10.7*   HEMATOCRIT 34.4*   MCV 77.1*   MCH 24.0*   RDW 43.8   PLATELETCT 348   MPV 10.4   NEUTSPOLYS 80.90*   LYMPHOCYTES 14.50*   MONOCYTES 3.80   EOSINOPHILS 0.20   BASOPHILS 0.30       Assessment:  POD day # 1 s/p RLTCS with bilateral salpingectomy      Plan:  Up to ambulate  Sethi out  Routine post operative care  Discharge home on POD# 2-3    Julio Cesar Herbert M.D.      "

## 2023-09-22 ENCOUNTER — PHARMACY VISIT (OUTPATIENT)
Dept: PHARMACY | Facility: MEDICAL CENTER | Age: 38
End: 2023-09-22
Payer: COMMERCIAL

## 2023-09-22 VITALS
HEART RATE: 71 BPM | HEIGHT: 68 IN | SYSTOLIC BLOOD PRESSURE: 110 MMHG | RESPIRATION RATE: 18 BRPM | DIASTOLIC BLOOD PRESSURE: 61 MMHG | TEMPERATURE: 97.3 F | OXYGEN SATURATION: 93 % | BODY MASS INDEX: 38.49 KG/M2 | WEIGHT: 254 LBS

## 2023-09-22 PROCEDURE — A9270 NON-COVERED ITEM OR SERVICE: HCPCS | Performed by: OBSTETRICS & GYNECOLOGY

## 2023-09-22 PROCEDURE — 700102 HCHG RX REV CODE 250 W/ 637 OVERRIDE(OP): Performed by: OBSTETRICS & GYNECOLOGY

## 2023-09-22 PROCEDURE — RXMED WILLOW AMBULATORY MEDICATION CHARGE: Performed by: OBSTETRICS & GYNECOLOGY

## 2023-09-22 RX ORDER — ACETAMINOPHEN 500 MG
1000 TABLET ORAL EVERY 6 HOURS
Qty: 30 TABLET | Refills: 0 | Status: SHIPPED | OUTPATIENT
Start: 2023-09-22

## 2023-09-22 RX ORDER — ACETAMINOPHEN 325 MG/1
650 TABLET ORAL EVERY 6 HOURS
Qty: 56 TABLET | Refills: 0 | Status: SHIPPED | OUTPATIENT
Start: 2023-09-22

## 2023-09-22 RX ORDER — OXYCODONE HYDROCHLORIDE 5 MG/1
5 TABLET ORAL EVERY 8 HOURS PRN
Qty: 21 TABLET | Refills: 0 | Status: SHIPPED | OUTPATIENT
Start: 2023-09-22 | End: 2023-09-29

## 2023-09-22 RX ORDER — PSEUDOEPHEDRINE HCL 30 MG
100 TABLET ORAL 2 TIMES DAILY PRN
Qty: 28 CAPSULE | Refills: 2 | Status: SHIPPED | OUTPATIENT
Start: 2023-09-22 | End: 2023-10-06

## 2023-09-22 RX ADMIN — ACETAMINOPHEN 1000 MG: 500 TABLET, FILM COATED ORAL at 11:53

## 2023-09-22 RX ADMIN — ACETAMINOPHEN 1000 MG: 500 TABLET, FILM COATED ORAL at 05:56

## 2023-09-22 RX ADMIN — OXYCODONE HYDROCHLORIDE 5 MG: 5 TABLET ORAL at 05:56

## 2023-09-22 RX ADMIN — ACETAMINOPHEN 1000 MG: 500 TABLET, FILM COATED ORAL at 00:03

## 2023-09-22 ASSESSMENT — PAIN DESCRIPTION - PAIN TYPE
TYPE: ACUTE PAIN;SURGICAL PAIN

## 2023-09-22 NOTE — PROGRESS NOTES
0700- report received from NOC RN. POC discussed including feeding intervals, I+O documentation, latch support, lochia changes, ambulation, infant temperature management including STS and layering, weights, VS intervals, and discharge planning. Medications and other comfort measures discussed. Call light in reach. Infant is currently breast and bottle feeding with enfamil. Plan to discharge home today.

## 2023-09-22 NOTE — DISCHARGE PLANNING
Meds-to-Beds: Discharge prescription orders listed below delivered to patient's bedside. RN notified. Patient counseled.      Current Outpatient Medications   Medication Sig Dispense Refill    acetaminophen (TYLENOL) 500 MG Tab Take 2 Tablets by mouth every 6 hours. 30 Tablet 0    docusate sodium 100 MG Cap Take 1 capsule by mouth 2 times a day as needed for Constipation for up to 14 days. 28 Capsule 2    oxyCODONE immediate-release (ROXICODONE) 5 MG Tab Take 1 Tablet by mouth every 8 hours as needed for Severe Pain for up to 7 days. 21 Tablet 0      Estrellita Valencia, PharmD

## 2023-09-22 NOTE — DISCHARGE INSTRUCTIONS

## 2023-09-22 NOTE — CARE PLAN
Problem: Infection - Postpartum  Goal: Postpartum patient will be free of signs and symptoms of infection  Outcome: Progressing     Problem: Pain - Standard  Goal: Alleviation of pain or a reduction in pain to the patient’s comfort goal  Outcome: Progressing     Problem: Altered Physiologic Condition  Goal: Patient physiologically stable as evidenced by normal lochia, palpable uterine involution and vitals within normal limits  Outcome: Progressing     The patient is Stable - Low risk of patient condition declining or worsening    Shift Goals  Clinical Goals: pain control, no s/s infection  Patient Goals: pain control,breast and formula feed  Family Goals: not present    Progress made toward(s) clinical / shift goals:  Pain well controlled with current medications as per MAR, pt is able to ambulate and care for self and infant without difficulty. Lochia remains light without clots expressed, performing kathe care and pad changes independently. No s/s infection noted on assessment, remains afebrile.     Patient is not progressing towards the following goals: NA

## 2023-09-22 NOTE — DISCHARGE SUMMARY
Obstetrics Discharge Summary    Admission Date: 2023         Discharge Date: 2023    ADMISSION DIAGNOSIS:  1.   intrauterine pregnancy at 36+3 weeks.  2.  Oligohydramnios.  3.   contractions.  4.  Chronic hypertension.  5.  History of  section x3.  6.  Desires permanent sterilization.    DISCHARGE DIAGNOSIS:  1.   intrauterine pregnancy at 36+3 weeks.  2.  Oligohydramnios.  3.   contractions.  4.  Chronic hypertension.  5.  History of  section x3.  6.  Desires permanent sterilization.    DETAILS OF HOSPITAL STAY  Presenting Problem/History of Present Illness: Abdominal pain and loss of fluid.    Hospital Course:  The patient is a 38 y.o. V3W0-2-8-4 who presented to [facility] at 36w3d weeks with a chief complaint of: Contractions and possible loss of fluid.. She had prenatal care with [facility] with Dr. Desai.  Her pregnancy was complicated by: history of  section x3 and chronic hypertension.    The patient had a history of 3 prior  section and desired a repeat  section.  She was diagnosed with oligohydramnios at 36 weeks and recommended delivery.  For full details of the operation, please refer to the operative report dictation. Briefly, the patient was taken to the operating room where a repeat  section was performed. Bilateral salpingectomy was preformed. There were no complications. Estimated blood loss was 500 ml. Findings included normal-appearing uterus, fallopian tubes and ovaries bilaterally. The patient delivered a viable female infant weighing 4 pounds 8.7 ounces (2060 g) with Apgars as below in delivery summary. The patient's recovery and post-operative courses were unremarkable. By post-operative day #2, the patient met all appropriate milestones and was stable to be discharged to home.    APGARs:   8   9      COMPLICATIONS: None.     PHYSICAL EXAM:  Vitals:   Vitals:    23 0640   BP:    Pulse:    Resp: 18    Temp:    SpO2:    General: Alert, conversational, pleasant, no acute distress  Cardiovascular: Regular rate  Pulmonary: No respiratory distress, symmetric expansion  Abdomen: Soft, non-tender, non-distended, fundus firm, non-tender, below the umbilicus; incision dressing clean, dry and intact  Genitourinary: Deferred  Extremities: Moves all, trace edema     LABS/STUDIES:    Latest Reference Range & Units 09/20/23 08:15 09/21/23 06:07   WBC 4.8 - 10.8 K/uL 9.4 12.6 (H)   RBC 4.20 - 5.40 M/uL 4.46 3.44 (L)   Hemoglobin 12.0 - 16.0 g/dL 10.7 (L) 8.2 (L)   Hematocrit 37.0 - 47.0 % 34.4 (L) 26.1 (L)   MCV 81.4 - 97.8 fL 77.1 (L) 75.9 (L)   MCH 27.0 - 33.0 pg 24.0 (L) 23.8 (L)   MCHC 32.2 - 35.5 g/dL 31.1 (L) 31.4 (L)   RDW 35.9 - 50.0 fL 43.8 43.3   Platelet Count 164 - 446 K/uL 348 288   MPV 9.0 - 12.9 fL 10.4 10.8     DISPOSITION: Home.    DISCHARGE MEDICATIONS:  - Oxycodone 5 mg PO every 8 hours prn pain x 7 days  - Tylenol 650 mg PO every 6 hours x 7 day  - Ibuprofen 650 mg PO every 6 hours x 7 day  - Colace 100mg PO BID x 30 days  - Continue home prescription of iron.    DISCHARGE INSTRUCTIONS:  1. Pelvic rest and no heavy lifting greater than 10 pounds for 6 weeks post-operatively.   2. No driving for at least 2 weeks or longer while requiring pain medication.   3. Incision check in 1 week at [facility] [phone]  4. Post-partum vist in 6 weeks at [facility] [phone]  5. Return to the emergency department if experiencing increased vaginal bleeding, severe pain, temperature greater than 100.4, or any other concerns.    DISCHARGE CONDITION: Stable.    Tess Desai M.D.

## 2023-09-22 NOTE — PROGRESS NOTES
1200- Discharge education provided and signed. All printed topics discussed. Emphasis provided on feeding plan, bleeding, and when to call doctor. follow up appointments discussed. All questions answered. No further needs at this time. Bands verified and cuddles removed from infant.     1340- Infant strapped into car seat by family and checked by RN. Escorted off unit. All belongings Present.

## 2023-11-14 ENCOUNTER — APPOINTMENT (OUTPATIENT)
Dept: TELEHEALTH | Facility: TELEMEDICINE | Age: 38
End: 2023-11-14
Payer: COMMERCIAL

## 2023-12-31 ENCOUNTER — OFFICE VISIT (OUTPATIENT)
Dept: URGENT CARE | Facility: PHYSICIAN GROUP | Age: 38
End: 2023-12-31
Payer: COMMERCIAL

## 2023-12-31 VITALS
HEART RATE: 89 BPM | DIASTOLIC BLOOD PRESSURE: 60 MMHG | WEIGHT: 244.2 LBS | OXYGEN SATURATION: 95 % | TEMPERATURE: 97.5 F | RESPIRATION RATE: 16 BRPM | HEIGHT: 68 IN | SYSTOLIC BLOOD PRESSURE: 100 MMHG | BODY MASS INDEX: 37.01 KG/M2

## 2023-12-31 DIAGNOSIS — J01.10 ACUTE NON-RECURRENT FRONTAL SINUSITIS: ICD-10-CM

## 2023-12-31 PROCEDURE — 99213 OFFICE O/P EST LOW 20 MIN: CPT

## 2023-12-31 PROCEDURE — 3078F DIAST BP <80 MM HG: CPT

## 2023-12-31 PROCEDURE — 3074F SYST BP LT 130 MM HG: CPT

## 2023-12-31 RX ORDER — BENZONATATE 100 MG/1
100 CAPSULE ORAL 3 TIMES DAILY PRN
Qty: 30 CAPSULE | Refills: 0 | Status: SHIPPED | OUTPATIENT
Start: 2023-12-31

## 2023-12-31 RX ORDER — DEXAMETHASONE SODIUM PHOSPHATE 10 MG/ML
10 INJECTION INTRAMUSCULAR; INTRAVENOUS ONCE
Status: COMPLETED | OUTPATIENT
Start: 2023-12-31 | End: 2023-12-31

## 2023-12-31 RX ORDER — FLUTICASONE PROPIONATE 50 MCG
1 SPRAY, SUSPENSION (ML) NASAL DAILY
Qty: 16 G | Refills: 0 | Status: SHIPPED | OUTPATIENT
Start: 2023-12-31

## 2023-12-31 RX ORDER — AMOXICILLIN AND CLAVULANATE POTASSIUM 875; 125 MG/1; MG/1
1 TABLET, FILM COATED ORAL 2 TIMES DAILY
Qty: 10 TABLET | Refills: 0 | Status: SHIPPED | OUTPATIENT
Start: 2023-12-31 | End: 2024-01-05

## 2023-12-31 RX ADMIN — DEXAMETHASONE SODIUM PHOSPHATE 10 MG: 10 INJECTION INTRAMUSCULAR; INTRAVENOUS at 09:51

## 2023-12-31 ASSESSMENT — ENCOUNTER SYMPTOMS
VOMITING: 0
NAUSEA: 0
DIZZINESS: 0
SENSORY CHANGE: 0
ABDOMINAL PAIN: 0
COUGH: 1
FEVER: 0
SINUS PAIN: 1
SORE THROAT: 0
CHILLS: 0
HEADACHES: 1
MYALGIAS: 1
SHORTNESS OF BREATH: 0

## 2023-12-31 ASSESSMENT — FIBROSIS 4 INDEX: FIB4 SCORE: 0.56

## 2023-12-31 NOTE — PROGRESS NOTES
Chief Complaint   Patient presents with    Sinus Pain     Head pressure, sinus pain, fever, cough, x1 week        HISTORY OF PRESENT ILLNESS: Patient is a pleasant 38 y.o. female who presents to urgent care today cold-like symptoms for the last week and a half with increasing sinus pain and pressure, nasal congestion with a headache.  Patient denies any dizziness, no major shortness of breath, no noted fevers.  She has taken OTC medication with little to no relief.    Patient Active Problem List    Diagnosis Date Noted    Acute post-operative pain 2023     uterine contractions 2023    Anemia during pregnancy 2023    Ear pain, right 2021    Boil of buttock 10/06/2021    History of ectopic pregnancy 2018    History of cervical dysplasia 2014    History of gastric bypass 2014    Migraine without aura 2011       Allergies:Sulfa drugs, Ibuprofen, K-y jelly [lubricating jelly], Latex, and Nsaids    Current Outpatient Medications Ordered in Epic   Medication Sig Dispense Refill    fluticasone (FLONASE) 50 MCG/ACT nasal spray Administer 1 Spray into affected nostril(S) every day. 16 g 0    benzonatate (TESSALON) 100 MG Cap Take 1 Capsule by mouth 3 times a day as needed for Cough. 30 Capsule 0    amoxicillin-clavulanate (AUGMENTIN) 875-125 MG Tab Take 1 Tablet by mouth 2 times a day for 5 days. 10 Tablet 0    acetaminophen (TYLENOL) 500 MG Tab Take 2 Tablets by mouth every 6 hours. 30 Tablet 0    acetaminophen (TYLENOL) 325 MG Tab Take 2 Tablets by mouth every 6 hours. 56 Tablet 0    Ferrous Sulfate (IRON) 90 (18 Fe) MG Tab Take 1 Tablet by mouth every day.      Prenatal Vit-Fe Fumarate-FA (PRENATAL 1+1 PO) Take  by mouth.       No current Epic-ordered facility-administered medications on file.       Past Medical History:   Diagnosis Date    Anemia during pregnancy 2023    Asthma     In childhood; hasn't used inhaler in 4 years per pt    Migraine without aura  "9/28/2011       Social History     Tobacco Use    Smoking status: Never    Smokeless tobacco: Never   Vaping Use    Vaping Use: Never used   Substance Use Topics    Alcohol use: Not Currently    Drug use: Never       Family Status   Relation Name Status    Mo  Alive    Fa  Alive    Sis  Alive    Bro  Alive     Family History   Problem Relation Age of Onset    No Known Problems Mother     No Known Problems Father     No Known Problems Sister     No Known Problems Brother        Review of Systems   Constitutional:  Positive for malaise/fatigue. Negative for chills and fever.   HENT:  Positive for congestion, ear pain and sinus pain. Negative for sore throat.    Respiratory:  Positive for cough. Negative for shortness of breath.    Gastrointestinal:  Negative for abdominal pain, nausea and vomiting.   Musculoskeletal:  Positive for myalgias.   Neurological:  Positive for headaches. Negative for dizziness and sensory change.       Exam:  /60 (BP Location: Right arm, Patient Position: Sitting, BP Cuff Size: Adult)   Pulse 89   Temp 36.4 °C (97.5 °F) (Temporal)   Resp 16   Ht 1.727 m (5' 8\")   Wt 111 kg (244 lb 3.2 oz)   SpO2 95%   Physical Exam  Constitutional:       Appearance: Normal appearance. She is normal weight. She is not toxic-appearing.   HENT:      Head: Normocephalic.      Right Ear: Tympanic membrane, ear canal and external ear normal. There is no impacted cerumen.      Left Ear: Tympanic membrane, ear canal and external ear normal. There is no impacted cerumen.      Nose: Congestion and rhinorrhea present. No nasal tenderness or mucosal edema. Rhinorrhea is purulent.      Right Turbinates: Enlarged and swollen.      Left Turbinates: Enlarged and swollen.      Right Sinus: Frontal sinus tenderness present.      Left Sinus: Frontal sinus tenderness present.      Mouth/Throat:      Mouth: Mucous membranes are moist.      Pharynx: No oropharyngeal exudate or posterior oropharyngeal erythema.      " Tonsils: No tonsillar exudate. 1+ on the right. 1+ on the left.   Eyes:      General:         Right eye: No discharge.         Left eye: No discharge.      Extraocular Movements: Extraocular movements intact.      Conjunctiva/sclera: Conjunctivae normal.      Pupils: Pupils are equal, round, and reactive to light.   Cardiovascular:      Rate and Rhythm: Normal rate and regular rhythm.      Pulses: Normal pulses.      Heart sounds: Normal heart sounds. No murmur heard.  Pulmonary:      Effort: Pulmonary effort is normal. No respiratory distress.      Breath sounds: Normal breath sounds. No stridor. No wheezing, rhonchi or rales.      Comments: Positive congested cough  Musculoskeletal:         General: No swelling, tenderness, deformity or signs of injury.      Cervical back: Normal range of motion and neck supple. No tenderness.      Right lower leg: No edema.      Left lower leg: No edema.   Skin:     General: Skin is warm and dry.      Capillary Refill: Capillary refill takes less than 2 seconds.      Findings: No bruising, erythema, lesion or rash.   Neurological:      General: No focal deficit present.      Mental Status: She is alert.      Sensory: No sensory deficit.      Motor: No weakness.      Coordination: Coordination normal.      Gait: Gait normal.   Psychiatric:         Mood and Affect: Mood normal.         Behavior: Behavior normal.         Thought Content: Thought content normal.         Judgment: Judgment normal.         Assessment/Plan:  1. Acute non-recurrent frontal sinusitis  - fluticasone (FLONASE) 50 MCG/ACT nasal spray; Administer 1 Spray into affected nostril(S) every day.  Dispense: 16 g; Refill: 0  - benzonatate (TESSALON) 100 MG Cap; Take 1 Capsule by mouth 3 times a day as needed for Cough.  Dispense: 30 Capsule; Refill: 0  - dexamethasone (Decadron) injection (check route below) 10 mg  - amoxicillin-clavulanate (AUGMENTIN) 875-125 MG Tab; Take 1 Tablet by mouth 2 times a day for 5 days.   Dispense: 10 Tablet; Refill: 0    Based on physical presentation and review of systems I do think this patient likely has a sinus infection.  She has been sick for approximately a week and a half with no relief from OTC medications.  Patient placed on Flonase, Tessalon and Augmentin.  Reviewed plan of care with the patient, she is aware and agreeable at this time, advised to take with food and plenty of fluids.      Supportive care, differential diagnoses, and indications for immediate follow-up discussed with patient.   Pathogenesis of diagnosis discussed including typical length and natural progression.   Instructed to return to clinic or nearest emergency department for any change in condition, further concerns, or worsening of symptoms.  Patient states understanding of the plan of care and discharge instructions.  Instructed to make an appointment, for follow up, with primary care provider.      Please note that this dictation was created using voice recognition software. I have made every reasonable attempt to correct obvious errors, but I expect that there are errors of grammar and possibly content that I did not discover before finalizing the note.      Marivel BARKLEY

## 2024-02-19 NOTE — PROGRESS NOTES
Letter created and printed for patient .   Also available in patient chart.    Pt's here for OB follow-up  Reports + fetal movement  No VB, LOF or UC's.  Good Phone #:[phone]  Pharmacy verified.  Pt states has had mild to heavy ctx that lasted for an hr 2 days ago. Also, Pt states has been having H/A today.  Pt states no complaints for today.

## 2024-08-01 ENCOUNTER — APPOINTMENT (OUTPATIENT)
Dept: MEDICAL GROUP | Facility: MEDICAL CENTER | Age: 39
End: 2024-08-01
Payer: COMMERCIAL

## 2024-08-01 VITALS
HEART RATE: 82 BPM | DIASTOLIC BLOOD PRESSURE: 62 MMHG | OXYGEN SATURATION: 98 % | TEMPERATURE: 97.4 F | HEIGHT: 68 IN | WEIGHT: 254.8 LBS | BODY MASS INDEX: 38.62 KG/M2 | SYSTOLIC BLOOD PRESSURE: 110 MMHG

## 2024-08-01 DIAGNOSIS — M25.562 ACUTE PAIN OF LEFT KNEE: ICD-10-CM

## 2024-08-01 DIAGNOSIS — I83.812 VARICOSE VEINS OF LEFT LOWER EXTREMITY WITH PAIN: ICD-10-CM

## 2024-08-01 DIAGNOSIS — O99.019 ANEMIA DURING PREGNANCY: ICD-10-CM

## 2024-08-01 DIAGNOSIS — T14.8XXA HEMATOMA: ICD-10-CM

## 2024-08-01 DIAGNOSIS — I83.891 VARICOSE VEINS OF LEG WITH SWELLING, RIGHT: ICD-10-CM

## 2024-08-01 DIAGNOSIS — E66.9 OBESITY (BMI 35.0-39.9 WITHOUT COMORBIDITY): ICD-10-CM

## 2024-08-01 DIAGNOSIS — E16.2 HYPOGLYCEMIA: ICD-10-CM

## 2024-08-01 DIAGNOSIS — Z11.59 NEED FOR HEPATITIS C SCREENING TEST: ICD-10-CM

## 2024-08-01 PROCEDURE — 3074F SYST BP LT 130 MM HG: CPT | Performed by: FAMILY MEDICINE

## 2024-08-01 PROCEDURE — 99214 OFFICE O/P EST MOD 30 MIN: CPT | Performed by: FAMILY MEDICINE

## 2024-08-01 PROCEDURE — 3078F DIAST BP <80 MM HG: CPT | Performed by: FAMILY MEDICINE

## 2024-08-01 ASSESSMENT — FIBROSIS 4 INDEX: FIB4 SCORE: 0.57

## 2024-08-01 ASSESSMENT — PATIENT HEALTH QUESTIONNAIRE - PHQ9: CLINICAL INTERPRETATION OF PHQ2 SCORE: 0
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
## (undated) DEVICE — SLEEVE, SEQUENTIAL CALF REG

## (undated) DEVICE — SUTURE 4-0 27IN VCRL PLUS ANTI (36PK/BX)

## (undated) DEVICE — TRAY SPINAL ANESTHESIA NON-SAFETY (10/CA)

## (undated) DEVICE — SENSOR SPO2 ADULT LNCS ADTX (20/BX) ORDER ITEM #19593

## (undated) DEVICE — TUBE CONNECT SUCTION CLEAR 120 X 1/4" (50EA/CA)"

## (undated) DEVICE — SENSOR SPO2 NEO LNCS ADHESIVE (20/BX) SEE USER NOTES

## (undated) DEVICE — KIT SYR LS LNDRW FLTRPRO DEV - (200/CA)

## (undated) DEVICE — CATHETER IV 18 GA X 1-1/4 - SAFETY BRAUN (50/BX)

## (undated) DEVICE — KIT  I.V. START (100EA/CA)

## (undated) DEVICE — SODIUM CHL IRRIGATION 0.9% 1000ML (12EA/CA)

## (undated) DEVICE — RETRACTOR O C SECTION LRY - (5/BX)

## (undated) DEVICE — CATHETER IV NON-SAFETY 18 GA X 1 1/4 (50/BX 4BX/CA)

## (undated) DEVICE — SET EXTENSION WITH 2 PORTS (48EA/CA) ***PART #2C8610 IS A SUBSTITUTE*****

## (undated) DEVICE — SUCTION INSTRUMENT YANKAUER BULBOUS TIP W/O VENT (50EA/CA)

## (undated) DEVICE — CHLORAPREP 26 ML APPLICATOR - ORANGE TINT(25/CA)

## (undated) DEVICE — CANNULA O2 COMFORT SOFT EAR ADULT 7 FT TUBING (50/CA)

## (undated) DEVICE — PACK C-SECTION (2EA/CA)

## (undated) DEVICE — CANISTER SUCTION 3000ML MECHANICAL FILTER AUTO SHUTOFF MEDI-VAC NONSTERILE LF DISP  (40EA/CA)

## (undated) DEVICE — PAD LAP STERILE 18 X 18 - (5/PK 40PK/CA)

## (undated) DEVICE — BLANKET STERILE CHICKIE FOR L&D (100/CA)

## (undated) DEVICE — WATER IRRIGATION STERILE 1000ML (12EA/CA)

## (undated) DEVICE — LACTATED RINGERS INJ 1000 ML - (14EA/CA 60CA/PF)

## (undated) DEVICE — PLUMEPEN ULTRA 3/8 IN X 10 FT HOSE (20EA/CA)

## (undated) DEVICE — SUTURE ABSORBABLE MONOFILAMENT VIOLET MONOCRYL CT-1 L36 IN (36EA/BX)

## (undated) DEVICE — SUTURE 0 GUT-PLAIN (36PK/BX)

## (undated) DEVICE — BLANKET UNDERBODY FULL ACCES - (5/CA)

## (undated) DEVICE — HEAD HOLDER JUNIOR/ADULT

## (undated) DEVICE — SUTURE 0 VICRYL PLUS CT-1 - 36 INCH (36/BX)

## (undated) DEVICE — PAD GROUNDING PRE-JELLED - (50EA/PK)

## (undated) DEVICE — DRESSING INTERCEED ABSORBABLE ADHESION BARRIER TC7 (10EA/CA)

## (undated) DEVICE — ELECTRODE RADIOLUCENT LF 3PK - RED DOT (3/PK 200PK/CA)

## (undated) DEVICE — TRAY FOLEY CATHETER STATLOCK 16FR SURESTEP  (10EA/CA)

## (undated) DEVICE — SUTURE 0 36IN PDS + VIO CT-1 (36PK/BX)

## (undated) DEVICE — SUTURE 3-0 VICRYL PLUS CT-1 - 36 INCH (36/BX)

## (undated) DEVICE — DRESSING POST OP BORDER 4 X 10 (5EA/BX)

## (undated) DEVICE — PACK ROOM TURNOVER L&D (12/CA)

## (undated) DEVICE — BAG SPONGE COUNT 10.25 X 32 - BLUE (250/CA)

## (undated) DEVICE — CHLORAPREP 3 ML APPLICATOR - (25/BX 4BX/CS 100/CS)

## (undated) DEVICE — LIGASURE SM JAW SEALER CRVD - (6EA/CA)

## (undated) DEVICE — SOLUTION PLASMA-LYTE PH 7.4 INJ 1000ML  (14EA/CA)

## (undated) DEVICE — TAPE CLOTH MEDIPORE 6 INCH - (12RL/CA)

## (undated) DEVICE — CLOSURE PRINEO SKIN - (2EA/BX)

## (undated) DEVICE — GLOVE BIOGEL PI ORTHO SZ 6 SURGICAL PF LF (40PR/BX)

## (undated) DEVICE — WRAP PROBE OXIMETER INFANT UNIVERSAL DESIGN TO FIT NEONATAL FOOT INFANT TOE OR PED FINGER  (12EA/BX)

## (undated) DEVICE — CLOSURE SKIN STRIP 1/2 X 4 IN - (STERI STRIP) (50/BX 4BX/CA)

## (undated) DEVICE — TUBING CLEARLINK DUO-VENT - C-FLO (48EA/CA)

## (undated) DEVICE — SWABSTICK BENZOIN SINGLE (50EA/BX)